# Patient Record
Sex: FEMALE | Race: WHITE | NOT HISPANIC OR LATINO | Employment: FULL TIME | URBAN - METROPOLITAN AREA
[De-identification: names, ages, dates, MRNs, and addresses within clinical notes are randomized per-mention and may not be internally consistent; named-entity substitution may affect disease eponyms.]

---

## 2017-03-31 ENCOUNTER — TELEPHONE (OUTPATIENT)
Dept: OBSTETRICS AND GYNECOLOGY | Facility: CLINIC | Age: 37
End: 2017-03-31

## 2017-03-31 DIAGNOSIS — A60.00 GENITAL HERPES SIMPLEX, UNSPECIFIED SITE: Primary | ICD-10-CM

## 2017-03-31 RX ORDER — VALACYCLOVIR HYDROCHLORIDE 500 MG/1
500 TABLET, FILM COATED ORAL 2 TIMES DAILY
Qty: 10 TABLET | Refills: 6 | Status: SHIPPED | OUTPATIENT
Start: 2017-03-31 | End: 2018-08-13

## 2017-08-01 DIAGNOSIS — N92.0 MENORRHAGIA: ICD-10-CM

## 2017-10-03 DIAGNOSIS — N92.0 MENORRHAGIA: ICD-10-CM

## 2018-02-05 ENCOUNTER — OFFICE VISIT (OUTPATIENT)
Dept: OBSTETRICS AND GYNECOLOGY | Facility: CLINIC | Age: 38
End: 2018-02-05
Attending: OBSTETRICS & GYNECOLOGY
Payer: OTHER GOVERNMENT

## 2018-02-05 VITALS
DIASTOLIC BLOOD PRESSURE: 80 MMHG | BODY MASS INDEX: 29.8 KG/M2 | HEIGHT: 63 IN | WEIGHT: 168.19 LBS | SYSTOLIC BLOOD PRESSURE: 120 MMHG

## 2018-02-05 DIAGNOSIS — R10.2 PELVIC PAIN IN FEMALE: Primary | ICD-10-CM

## 2018-02-05 LAB
BACTERIA #/AREA URNS AUTO: ABNORMAL /HPF
BILIRUB UR QL STRIP: NEGATIVE
CLARITY UR REFRACT.AUTO: ABNORMAL
COLOR UR AUTO: YELLOW
GLUCOSE UR QL STRIP: NEGATIVE
HGB UR QL STRIP: ABNORMAL
KETONES UR QL STRIP: NEGATIVE
LEUKOCYTE ESTERASE UR QL STRIP: ABNORMAL
MICROSCOPIC COMMENT: ABNORMAL
NITRITE UR QL STRIP: NEGATIVE
PH UR STRIP: 5 [PH] (ref 5–8)
PROT UR QL STRIP: NEGATIVE
RBC #/AREA URNS AUTO: 4 /HPF (ref 0–4)
SP GR UR STRIP: 1.01 (ref 1–1.03)
SQUAMOUS #/AREA URNS AUTO: 3 /HPF
URN SPEC COLLECT METH UR: ABNORMAL
UROBILINOGEN UR STRIP-ACNC: NEGATIVE EU/DL
WBC #/AREA URNS AUTO: 7 /HPF (ref 0–5)

## 2018-02-05 PROCEDURE — 87077 CULTURE AEROBIC IDENTIFY: CPT

## 2018-02-05 PROCEDURE — 81001 URINALYSIS AUTO W/SCOPE: CPT

## 2018-02-05 PROCEDURE — 87086 URINE CULTURE/COLONY COUNT: CPT

## 2018-02-05 PROCEDURE — 87186 SC STD MICRODIL/AGAR DIL: CPT

## 2018-02-05 PROCEDURE — 3008F BODY MASS INDEX DOCD: CPT | Mod: ,,, | Performed by: OBSTETRICS & GYNECOLOGY

## 2018-02-05 PROCEDURE — 99213 OFFICE O/P EST LOW 20 MIN: CPT | Mod: S$PBB,,, | Performed by: OBSTETRICS & GYNECOLOGY

## 2018-02-05 PROCEDURE — 99212 OFFICE O/P EST SF 10 MIN: CPT | Mod: PBBFAC | Performed by: OBSTETRICS & GYNECOLOGY

## 2018-02-05 PROCEDURE — 87088 URINE BACTERIA CULTURE: CPT

## 2018-02-05 PROCEDURE — 99999 PR PBB SHADOW E&M-EST. PATIENT-LVL II: CPT | Mod: PBBFAC,,, | Performed by: OBSTETRICS & GYNECOLOGY

## 2018-02-05 NOTE — PROGRESS NOTES
"Chief Complaint: Pelvic pain    Sreedhar Tyler is a 37 y.o. female  presents with dull-achy pain on the right-side in her lower pelvis.  She also reports discomfort with intercourse in the same area.  Her cycles have been irregular in flow and timing.      She has a history of a hemorrhagic cyst of the left ovary.      She stopped her OCP approximately 4 months ago to conceive.      /80   Ht 5' 3" (1.6 m)   Wt 76.3 kg (168 lb 3.4 oz)   LMP 2018 (Approximate)   BMI 29.80 kg/m²     ROS:  GENERAL: No fever, chills, fatigability or weight loss.  VULVAR: No pain, no lesions and no itching.  VAGINAL: No relaxation, no itching, no discharge, no abnormal bleeding and no lesions.  ABDOMEN: Reports abdominal pain. Denies nausea. Denies vomiting. No diarrhea. No constipation  BREAST: Denies pain. No lumps. No discharge.  URINARY: No incontinence, no nocturia, no frequency and no dysuria.  CARDIOVASCULAR: No chest pain. No shortness of breath. No leg cramps.  NEUROLOGICAL: No headaches. No vision changes.    PHYSICAL EXAM:    PELVIC: Normal external genitalia without lesions.  Normal hair distribution.  Adequate perineal body, normal urethral meatus.  Vagina moist and well rugated without lesions or discharge.  Cervix pink, without lesions, discharge or tenderness.  No significant cystocele or rectocele.  Bimanual exam shows uterus to be normal size, regular, mobile and nontender.  Adnexa without masses or tenderness but some discomfort on the right.      1. Pelvic pain in female  US Pelvis Comp with Transvag NON-OB (xpd    Urine culture    Urinalysis       Discussed possible causes of pelvic pain.  Ultrasound and UA C&S ordered to rule out causes.     Will email with results of above.        "

## 2018-02-08 ENCOUNTER — PATIENT MESSAGE (OUTPATIENT)
Dept: OBSTETRICS AND GYNECOLOGY | Facility: CLINIC | Age: 38
End: 2018-02-08

## 2018-02-08 DIAGNOSIS — N30.00 ACUTE CYSTITIS WITHOUT HEMATURIA: Primary | ICD-10-CM

## 2018-02-08 LAB — BACTERIA UR CULT: NORMAL

## 2018-02-08 RX ORDER — SULFAMETHOXAZOLE AND TRIMETHOPRIM 400; 80 MG/1; MG/1
1 TABLET ORAL 2 TIMES DAILY
Qty: 20 TABLET | Refills: 0 | Status: SHIPPED | OUTPATIENT
Start: 2018-02-08 | End: 2018-02-18

## 2018-02-15 ENCOUNTER — HOSPITAL ENCOUNTER (OUTPATIENT)
Dept: RADIOLOGY | Facility: HOSPITAL | Age: 38
Discharge: HOME | End: 2018-02-15
Attending: OBSTETRICS & GYNECOLOGY
Payer: OTHER GOVERNMENT

## 2018-02-15 DIAGNOSIS — R10.2 PELVIC PAIN IN FEMALE: ICD-10-CM

## 2018-02-15 PROCEDURE — 76830 TRANSVAGINAL US NON-OB: CPT | Mod: TC

## 2018-02-15 PROCEDURE — 76830 TRANSVAGINAL US NON-OB: CPT | Mod: 26,,, | Performed by: RADIOLOGY

## 2018-02-15 PROCEDURE — 76856 US EXAM PELVIC COMPLETE: CPT | Mod: 26,,, | Performed by: RADIOLOGY

## 2018-08-08 ENCOUNTER — TELEPHONE (OUTPATIENT)
Dept: OBSTETRICS AND GYNECOLOGY | Facility: CLINIC | Age: 38
End: 2018-08-08

## 2018-08-08 NOTE — TELEPHONE ENCOUNTER
Returned the pt's call to the clinic. Pt informed that Dr. Salinas isn't taking newly pregnant ob pt's and that a message will be sent to her to inquire if she can see the pt for her pregnancy. Pt informed that she will be contacted with Dr. Salinas's response. Pt verbalized understanding.        Can you see this patient for her pregnancy?

## 2018-08-08 NOTE — TELEPHONE ENCOUNTER
----- Message from Chichi Spencer sent at 8/8/2018 12:08 PM CDT -----  Contact: pt            Name of Who is Calling: TRISHA CROWE [[id number]]      What is the request in detail: pt is current pt of Dr Salinas and took a positive pregnancy test 2 weeks ago.. Pt would like to know if Dr Salinas would see her as a OB patient.. Please advise      Can the clinic reply by [facility]: no      What Number to Call Back if not in [facility]: [phone]

## 2018-08-10 ENCOUNTER — TELEPHONE (OUTPATIENT)
Dept: OBSTETRICS AND GYNECOLOGY | Facility: CLINIC | Age: 38
End: 2018-08-10

## 2018-08-10 DIAGNOSIS — Z32.01 POSITIVE URINE PREGNANCY TEST: Primary | ICD-10-CM

## 2018-08-10 NOTE — TELEPHONE ENCOUNTER
Contacted the pt to inform her that Dr. Salinas will be able to see her for her new pregnancy. Pt agreed to an appointment on 8/21 at 1PM With SWATHI Townsend and US appointment at 2PM on 8/21. Pt also agreed to an appointment on 9/20 at 3PM for her initial ob appointment with Dr. Salinas. Pt verbalized understanding. Letters sent out.

## 2018-08-12 ENCOUNTER — HOSPITAL ENCOUNTER (OUTPATIENT)
Facility: OTHER | Age: 38
Discharge: HOME | End: 2018-08-13
Attending: EMERGENCY MEDICINE | Admitting: OBSTETRICS & GYNECOLOGY
Payer: OTHER GOVERNMENT

## 2018-08-12 DIAGNOSIS — O03.9 SPONTANEOUS ABORTION: ICD-10-CM

## 2018-08-12 DIAGNOSIS — O20.9 VAGINAL BLEEDING AFFECTING EARLY PREGNANCY: Primary | ICD-10-CM

## 2018-08-12 PROCEDURE — 99291 CRITICAL CARE FIRST HOUR: CPT | Mod: ,,, | Performed by: EMERGENCY MEDICINE

## 2018-08-12 PROCEDURE — 86901 BLOOD TYPING SEROLOGIC RH(D): CPT

## 2018-08-12 PROCEDURE — 80053 COMPREHEN METABOLIC PANEL: CPT

## 2018-08-12 PROCEDURE — 85025 COMPLETE CBC W/AUTO DIFF WBC: CPT

## 2018-08-12 PROCEDURE — 84702 CHORIONIC GONADOTROPIN TEST: CPT

## 2018-08-12 RX ORDER — SODIUM CHLORIDE 9 MG/ML
125 INJECTION, SOLUTION INTRAVENOUS CONTINUOUS
Status: DISCONTINUED | OUTPATIENT
Start: 2018-08-13 | End: 2018-08-13 | Stop reason: HOSPADM

## 2018-08-13 ENCOUNTER — ANESTHESIA EVENT (OUTPATIENT)
Dept: SURGERY | Facility: OTHER | Age: 38
End: 2018-08-13
Payer: OTHER GOVERNMENT

## 2018-08-13 ENCOUNTER — ANESTHESIA (OUTPATIENT)
Dept: SURGERY | Facility: OTHER | Age: 38
End: 2018-08-13
Payer: OTHER GOVERNMENT

## 2018-08-13 VITALS
SYSTOLIC BLOOD PRESSURE: 101 MMHG | HEIGHT: 63 IN | HEART RATE: 81 BPM | RESPIRATION RATE: 18 BRPM | TEMPERATURE: 99 F | OXYGEN SATURATION: 98 % | DIASTOLIC BLOOD PRESSURE: 61 MMHG | WEIGHT: 155 LBS | BODY MASS INDEX: 27.46 KG/M2

## 2018-08-13 PROBLEM — O20.9 VAGINAL BLEEDING AFFECTING EARLY PREGNANCY: Status: ACTIVE | Noted: 2018-08-13

## 2018-08-13 LAB
ABO + RH BLD: NORMAL
ABO + RH BLD: NORMAL
ALBUMIN SERPL BCP-MCNC: 3.6 G/DL
ALP SERPL-CCNC: 66 U/L
ALT SERPL W/O P-5'-P-CCNC: 12 U/L
ANION GAP SERPL CALC-SCNC: 12 MMOL/L
AST SERPL-CCNC: 14 U/L
BASOPHILS # BLD AUTO: 0.03 K/UL
BASOPHILS # BLD AUTO: 0.07 K/UL
BASOPHILS NFR BLD: 0.2 %
BASOPHILS NFR BLD: 0.5 %
BILIRUB SERPL-MCNC: 0.4 MG/DL
BLD GP AB SCN CELLS X3 SERPL QL: NORMAL
BLD GP AB SCN CELLS X3 SERPL QL: NORMAL
BLD PROD TYP BPU: NORMAL
BLOOD UNIT EXPIRATION DATE: NORMAL
BLOOD UNIT TYPE CODE: 5100
BLOOD UNIT TYPE: NORMAL
BUN SERPL-MCNC: 12 MG/DL
CALCIUM SERPL-MCNC: 9 MG/DL
CHLORIDE SERPL-SCNC: 104 MMOL/L
CO2 SERPL-SCNC: 20 MMOL/L
CODING SYSTEM: NORMAL
CREAT SERPL-MCNC: 0.8 MG/DL
DIFFERENTIAL METHOD: ABNORMAL
DIFFERENTIAL METHOD: ABNORMAL
DISPENSE STATUS: NORMAL
EOSINOPHIL # BLD AUTO: 0 K/UL
EOSINOPHIL # BLD AUTO: 0 K/UL
EOSINOPHIL NFR BLD: 0.1 %
EOSINOPHIL NFR BLD: 0.2 %
ERYTHROCYTE [DISTWIDTH] IN BLOOD BY AUTOMATED COUNT: 13.4 %
ERYTHROCYTE [DISTWIDTH] IN BLOOD BY AUTOMATED COUNT: 13.7 %
EST. GFR  (AFRICAN AMERICAN): >60 ML/MIN/1.73 M^2
EST. GFR  (NON AFRICAN AMERICAN): >60 ML/MIN/1.73 M^2
GLUCOSE SERPL-MCNC: 232 MG/DL
HCG INTACT+B SERPL-ACNC: NORMAL MIU/ML
HCT VFR BLD AUTO: 23.9 %
HCT VFR BLD AUTO: 33.1 %
HGB BLD-MCNC: 11.4 G/DL
HGB BLD-MCNC: 8 G/DL
IMM GRANULOCYTES # BLD AUTO: 0.06 K/UL
IMM GRANULOCYTES NFR BLD AUTO: 0.4 %
LYMPHOCYTES # BLD AUTO: 2.6 K/UL
LYMPHOCYTES # BLD AUTO: 2.8 K/UL
LYMPHOCYTES NFR BLD: 17.7 %
LYMPHOCYTES NFR BLD: 20.7 %
MCH RBC QN AUTO: 27.2 PG
MCH RBC QN AUTO: 28.3 PG
MCHC RBC AUTO-ENTMCNC: 33.5 G/DL
MCHC RBC AUTO-ENTMCNC: 34.4 G/DL
MCV RBC AUTO: 81 FL
MCV RBC AUTO: 82 FL
MONOCYTES # BLD AUTO: 0.9 K/UL
MONOCYTES # BLD AUTO: 1 K/UL
MONOCYTES NFR BLD: 5.9 %
MONOCYTES NFR BLD: 7.4 %
NEUTROPHILS # BLD AUTO: 11.1 K/UL
NEUTROPHILS # BLD AUTO: 9.7 K/UL
NEUTROPHILS NFR BLD: 71.2 %
NEUTROPHILS NFR BLD: 75.3 %
NRBC BLD-RTO: 0 /100 WBC
NUM UNITS TRANS PACKED RBC: NORMAL
PLATELET # BLD AUTO: 308 K/UL
PLATELET # BLD AUTO: 432 K/UL
PMV BLD AUTO: 10.3 FL
PMV BLD AUTO: 9.6 FL
POTASSIUM SERPL-SCNC: 3.8 MMOL/L
PROT SERPL-MCNC: 6.6 G/DL
RBC # BLD AUTO: 2.94 M/UL
RBC # BLD AUTO: 4.03 M/UL
SODIUM SERPL-SCNC: 136 MMOL/L
WBC # BLD AUTO: 13.67 K/UL
WBC # BLD AUTO: 14.73 K/UL

## 2018-08-13 PROCEDURE — S0030 INJECTION, METRONIDAZOLE: HCPCS | Performed by: STUDENT IN AN ORGANIZED HEALTH CARE EDUCATION/TRAINING PROGRAM

## 2018-08-13 PROCEDURE — 63600175 PHARM REV CODE 636 W HCPCS: Performed by: EMERGENCY MEDICINE

## 2018-08-13 PROCEDURE — 63600175 PHARM REV CODE 636 W HCPCS: Performed by: STUDENT IN AN ORGANIZED HEALTH CARE EDUCATION/TRAINING PROGRAM

## 2018-08-13 PROCEDURE — 96375 TX/PRO/DX INJ NEW DRUG ADDON: CPT | Mod: 59

## 2018-08-13 PROCEDURE — 36000705 HC OR TIME LEV I EA ADD 15 MIN: Performed by: OBSTETRICS & GYNECOLOGY

## 2018-08-13 PROCEDURE — 25000003 PHARM REV CODE 250: Performed by: STUDENT IN AN ORGANIZED HEALTH CARE EDUCATION/TRAINING PROGRAM

## 2018-08-13 PROCEDURE — 71000033 HC RECOVERY, INTIAL HOUR: Performed by: OBSTETRICS & GYNECOLOGY

## 2018-08-13 PROCEDURE — 37000009 HC ANESTHESIA EA ADD 15 MINS: Performed by: OBSTETRICS & GYNECOLOGY

## 2018-08-13 PROCEDURE — 59812 TREATMENT OF MISCARRIAGE: CPT | Mod: ,,, | Performed by: OBSTETRICS & GYNECOLOGY

## 2018-08-13 PROCEDURE — 01965 ANES INCOMPL/MISSED AB PX: CPT | Performed by: OBSTETRICS & GYNECOLOGY

## 2018-08-13 PROCEDURE — 99285 EMERGENCY DEPT VISIT HI MDM: CPT | Mod: 25

## 2018-08-13 PROCEDURE — 36000704 HC OR TIME LEV I 1ST 15 MIN: Performed by: OBSTETRICS & GYNECOLOGY

## 2018-08-13 PROCEDURE — 37000008 HC ANESTHESIA 1ST 15 MINUTES: Performed by: OBSTETRICS & GYNECOLOGY

## 2018-08-13 PROCEDURE — P9045 ALBUMIN (HUMAN), 5%, 250 ML: HCPCS | Mod: JG | Performed by: NURSE ANESTHETIST, CERTIFIED REGISTERED

## 2018-08-13 PROCEDURE — G0378 HOSPITAL OBSERVATION PER HR: HCPCS

## 2018-08-13 PROCEDURE — 63600175 PHARM REV CODE 636 W HCPCS: Mod: JG | Performed by: NURSE ANESTHETIST, CERTIFIED REGISTERED

## 2018-08-13 PROCEDURE — 88305 TISSUE EXAM BY PATHOLOGIST: CPT | Mod: 26,,, | Performed by: PATHOLOGY

## 2018-08-13 PROCEDURE — 96374 THER/PROPH/DIAG INJ IV PUSH: CPT

## 2018-08-13 PROCEDURE — 85025 COMPLETE CBC W/AUTO DIFF WBC: CPT

## 2018-08-13 PROCEDURE — 25000003 PHARM REV CODE 250: Performed by: NURSE ANESTHETIST, CERTIFIED REGISTERED

## 2018-08-13 PROCEDURE — 94761 N-INVAS EAR/PLS OXIMETRY MLT: CPT | Mod: 59

## 2018-08-13 PROCEDURE — 86920 COMPATIBILITY TEST SPIN: CPT

## 2018-08-13 PROCEDURE — 88305 TISSUE EXAM BY PATHOLOGIST: CPT | Performed by: PATHOLOGY

## 2018-08-13 PROCEDURE — 86850 RBC ANTIBODY SCREEN: CPT

## 2018-08-13 PROCEDURE — 36415 COLL VENOUS BLD VENIPUNCTURE: CPT

## 2018-08-13 PROCEDURE — 36430 TRANSFUSION BLD/BLD COMPNT: CPT

## 2018-08-13 PROCEDURE — P9016 RBC LEUKOCYTES REDUCED: HCPCS

## 2018-08-13 RX ORDER — HYDROCODONE BITARTRATE AND ACETAMINOPHEN 5; 325 MG/1; MG/1
1 TABLET ORAL EVERY 4 HOURS PRN
Status: DISCONTINUED | OUTPATIENT
Start: 2018-08-13 | End: 2018-08-13 | Stop reason: HOSPADM

## 2018-08-13 RX ORDER — METRONIDAZOLE 500 MG/100ML
500 INJECTION, SOLUTION INTRAVENOUS ONCE
Status: COMPLETED | OUTPATIENT
Start: 2018-08-13 | End: 2018-08-13

## 2018-08-13 RX ORDER — LIDOCAINE HCL/PF 100 MG/5ML
SYRINGE (ML) INTRAVENOUS
Status: DISCONTINUED | OUTPATIENT
Start: 2018-08-13 | End: 2018-08-13

## 2018-08-13 RX ORDER — SUCCINYLCHOLINE CHLORIDE 20 MG/ML
INJECTION INTRAMUSCULAR; INTRAVENOUS
Status: DISCONTINUED | OUTPATIENT
Start: 2018-08-13 | End: 2018-08-13

## 2018-08-13 RX ORDER — KETOROLAC TROMETHAMINE 30 MG/ML
INJECTION, SOLUTION INTRAMUSCULAR; INTRAVENOUS
Status: DISCONTINUED | OUTPATIENT
Start: 2018-08-13 | End: 2018-08-13

## 2018-08-13 RX ORDER — MEPERIDINE HYDROCHLORIDE 50 MG/ML
12.5 INJECTION INTRAMUSCULAR; INTRAVENOUS; SUBCUTANEOUS ONCE AS NEEDED
Status: DISCONTINUED | OUTPATIENT
Start: 2018-08-13 | End: 2018-08-13 | Stop reason: HOSPADM

## 2018-08-13 RX ORDER — DIPHENHYDRAMINE HYDROCHLORIDE 50 MG/ML
25 INJECTION INTRAMUSCULAR; INTRAVENOUS EVERY 4 HOURS PRN
Status: DISCONTINUED | OUTPATIENT
Start: 2018-08-13 | End: 2018-08-13 | Stop reason: HOSPADM

## 2018-08-13 RX ORDER — DEXAMETHASONE SODIUM PHOSPHATE 4 MG/ML
INJECTION, SOLUTION INTRA-ARTICULAR; INTRALESIONAL; INTRAMUSCULAR; INTRAVENOUS; SOFT TISSUE
Status: DISCONTINUED | OUTPATIENT
Start: 2018-08-13 | End: 2018-08-13

## 2018-08-13 RX ORDER — ONDANSETRON 2 MG/ML
4 INJECTION INTRAMUSCULAR; INTRAVENOUS
Status: COMPLETED | OUTPATIENT
Start: 2018-08-13 | End: 2018-08-13

## 2018-08-13 RX ORDER — HYDROCODONE BITARTRATE AND ACETAMINOPHEN 10; 325 MG/1; MG/1
1 TABLET ORAL EVERY 4 HOURS PRN
Status: DISCONTINUED | OUTPATIENT
Start: 2018-08-13 | End: 2018-08-13 | Stop reason: HOSPADM

## 2018-08-13 RX ORDER — METOCLOPRAMIDE HYDROCHLORIDE 5 MG/ML
5 INJECTION INTRAMUSCULAR; INTRAVENOUS EVERY 6 HOURS PRN
Status: DISCONTINUED | OUTPATIENT
Start: 2018-08-13 | End: 2018-08-13 | Stop reason: HOSPADM

## 2018-08-13 RX ORDER — MORPHINE SULFATE 4 MG/ML
4 INJECTION, SOLUTION INTRAMUSCULAR; INTRAVENOUS
Status: COMPLETED | OUTPATIENT
Start: 2018-08-13 | End: 2018-08-13

## 2018-08-13 RX ORDER — SODIUM CHLORIDE 0.9 % (FLUSH) 0.9 %
3 SYRINGE (ML) INJECTION
Status: DISCONTINUED | OUTPATIENT
Start: 2018-08-13 | End: 2018-08-13 | Stop reason: HOSPADM

## 2018-08-13 RX ORDER — DIPHENHYDRAMINE HCL 25 MG
25 CAPSULE ORAL EVERY 4 HOURS PRN
Status: DISCONTINUED | OUTPATIENT
Start: 2018-08-13 | End: 2018-08-13 | Stop reason: HOSPADM

## 2018-08-13 RX ORDER — MISOPROSTOL 100 UG/1
200 TABLET ORAL EVERY 6 HOURS
Status: DISCONTINUED | OUTPATIENT
Start: 2018-08-13 | End: 2018-08-13 | Stop reason: HOSPADM

## 2018-08-13 RX ORDER — IBUPROFEN 600 MG/1
600 TABLET ORAL EVERY 6 HOURS PRN
Status: DISCONTINUED | OUTPATIENT
Start: 2018-08-13 | End: 2018-08-13 | Stop reason: HOSPADM

## 2018-08-13 RX ORDER — SODIUM CHLORIDE 9 MG/ML
INJECTION, SOLUTION INTRAVENOUS CONTINUOUS
Status: DISCONTINUED | OUTPATIENT
Start: 2018-08-13 | End: 2018-08-13

## 2018-08-13 RX ORDER — HYDROCODONE BITARTRATE AND ACETAMINOPHEN 500; 5 MG/1; MG/1
TABLET ORAL
Status: DISCONTINUED | OUTPATIENT
Start: 2018-08-13 | End: 2018-08-13 | Stop reason: HOSPADM

## 2018-08-13 RX ORDER — ONDANSETRON 8 MG/1
8 TABLET, ORALLY DISINTEGRATING ORAL EVERY 8 HOURS PRN
Status: DISCONTINUED | OUTPATIENT
Start: 2018-08-13 | End: 2018-08-13 | Stop reason: HOSPADM

## 2018-08-13 RX ORDER — ONDANSETRON 2 MG/ML
4 INJECTION INTRAMUSCULAR; INTRAVENOUS DAILY PRN
Status: DISCONTINUED | OUTPATIENT
Start: 2018-08-13 | End: 2018-08-13 | Stop reason: HOSPADM

## 2018-08-13 RX ORDER — ROCURONIUM BROMIDE 10 MG/ML
INJECTION, SOLUTION INTRAVENOUS
Status: DISCONTINUED | OUTPATIENT
Start: 2018-08-13 | End: 2018-08-13

## 2018-08-13 RX ORDER — METRONIDAZOLE 500 MG/100ML
500 INJECTION, SOLUTION INTRAVENOUS ONCE
Status: DISCONTINUED | OUTPATIENT
Start: 2018-08-13 | End: 2018-08-13 | Stop reason: HOSPADM

## 2018-08-13 RX ORDER — DIPHENHYDRAMINE HYDROCHLORIDE 50 MG/ML
12.5 INJECTION INTRAMUSCULAR; INTRAVENOUS EVERY 30 MIN PRN
Status: DISCONTINUED | OUTPATIENT
Start: 2018-08-13 | End: 2018-08-13 | Stop reason: HOSPADM

## 2018-08-13 RX ORDER — FENTANYL CITRATE 50 UG/ML
25 INJECTION, SOLUTION INTRAMUSCULAR; INTRAVENOUS EVERY 5 MIN PRN
Status: DISCONTINUED | OUTPATIENT
Start: 2018-08-13 | End: 2018-08-13 | Stop reason: HOSPADM

## 2018-08-13 RX ORDER — OXYCODONE HYDROCHLORIDE 5 MG/1
5 TABLET ORAL
Status: DISCONTINUED | OUTPATIENT
Start: 2018-08-13 | End: 2018-08-13 | Stop reason: HOSPADM

## 2018-08-13 RX ORDER — MISOPROSTOL 200 UG/1
200 TABLET ORAL EVERY 6 HOURS
Qty: 3 TABLET | Refills: 0 | Status: SHIPPED | OUTPATIENT
Start: 2018-08-13 | End: 2018-08-21

## 2018-08-13 RX ORDER — ACETAMINOPHEN 10 MG/ML
INJECTION, SOLUTION INTRAVENOUS
Status: DISCONTINUED | OUTPATIENT
Start: 2018-08-13 | End: 2018-08-13

## 2018-08-13 RX ORDER — ONDANSETRON 2 MG/ML
INJECTION INTRAMUSCULAR; INTRAVENOUS
Status: DISCONTINUED | OUTPATIENT
Start: 2018-08-13 | End: 2018-08-13

## 2018-08-13 RX ORDER — FENTANYL CITRATE 50 UG/ML
INJECTION, SOLUTION INTRAMUSCULAR; INTRAVENOUS
Status: DISCONTINUED | OUTPATIENT
Start: 2018-08-13 | End: 2018-08-13

## 2018-08-13 RX ORDER — HYDROMORPHONE HYDROCHLORIDE 2 MG/ML
0.4 INJECTION, SOLUTION INTRAMUSCULAR; INTRAVENOUS; SUBCUTANEOUS EVERY 5 MIN PRN
Status: DISCONTINUED | OUTPATIENT
Start: 2018-08-13 | End: 2018-08-13 | Stop reason: HOSPADM

## 2018-08-13 RX ORDER — ONDANSETRON 4 MG/1
4 TABLET, ORALLY DISINTEGRATING ORAL
Status: DISCONTINUED | OUTPATIENT
Start: 2018-08-13 | End: 2018-08-13

## 2018-08-13 RX ORDER — SODIUM CHLORIDE, SODIUM LACTATE, POTASSIUM CHLORIDE, CALCIUM CHLORIDE 600; 310; 30; 20 MG/100ML; MG/100ML; MG/100ML; MG/100ML
INJECTION, SOLUTION INTRAVENOUS CONTINUOUS PRN
Status: DISCONTINUED | OUTPATIENT
Start: 2018-08-13 | End: 2018-08-13

## 2018-08-13 RX ORDER — PROPOFOL 10 MG/ML
VIAL (ML) INTRAVENOUS
Status: DISCONTINUED | OUTPATIENT
Start: 2018-08-13 | End: 2018-08-13

## 2018-08-13 RX ORDER — ALBUMIN HUMAN 50 G/1000ML
SOLUTION INTRAVENOUS CONTINUOUS PRN
Status: DISCONTINUED | OUTPATIENT
Start: 2018-08-13 | End: 2018-08-13

## 2018-08-13 RX ADMIN — ALBUMIN (HUMAN): 2.5 SOLUTION INTRAVENOUS at 04:08

## 2018-08-13 RX ADMIN — LIDOCAINE HYDROCHLORIDE 100 MG: 20 INJECTION, SOLUTION INTRAVENOUS at 04:08

## 2018-08-13 RX ADMIN — SODIUM CHLORIDE 1000 ML: 0.9 INJECTION, SOLUTION INTRAVENOUS at 12:08

## 2018-08-13 RX ADMIN — SUCCINYLCHOLINE CHLORIDE 140 MG: 20 INJECTION, SOLUTION INTRAMUSCULAR; INTRAVENOUS at 04:08

## 2018-08-13 RX ADMIN — ROCURONIUM BROMIDE 5 MG: 10 INJECTION, SOLUTION INTRAVENOUS at 04:08

## 2018-08-13 RX ADMIN — SODIUM CHLORIDE: 0.9 INJECTION, SOLUTION INTRAVENOUS at 06:08

## 2018-08-13 RX ADMIN — ONDANSETRON 4 MG: 2 INJECTION INTRAMUSCULAR; INTRAVENOUS at 04:08

## 2018-08-13 RX ADMIN — MISOPROSTOL 200 MCG: 100 TABLET ORAL at 08:08

## 2018-08-13 RX ADMIN — KETOROLAC TROMETHAMINE 30 MG: 30 INJECTION, SOLUTION INTRAMUSCULAR; INTRAVENOUS at 04:08

## 2018-08-13 RX ADMIN — METRONIDAZOLE 500 MG: 500 SOLUTION INTRAVENOUS at 04:08

## 2018-08-13 RX ADMIN — CARBOXYMETHYLCELLULOSE SODIUM 4 DROP: 2.5 SOLUTION/ DROPS OPHTHALMIC at 04:08

## 2018-08-13 RX ADMIN — MORPHINE SULFATE 4 MG: 4 INJECTION INTRAVENOUS at 02:08

## 2018-08-13 RX ADMIN — SODIUM CHLORIDE, SODIUM LACTATE, POTASSIUM CHLORIDE, AND CALCIUM CHLORIDE: .6; .31; .03; .02 INJECTION, SOLUTION INTRAVENOUS at 04:08

## 2018-08-13 RX ADMIN — ACETAMINOPHEN 1000 MG: 10 INJECTION, SOLUTION INTRAVENOUS at 04:08

## 2018-08-13 RX ADMIN — DEXAMETHASONE SODIUM PHOSPHATE 8 MG: 4 INJECTION, SOLUTION INTRAMUSCULAR; INTRAVENOUS at 04:08

## 2018-08-13 RX ADMIN — SODIUM CHLORIDE 125 ML/HR: 0.9 INJECTION, SOLUTION INTRAVENOUS at 12:08

## 2018-08-13 RX ADMIN — ONDANSETRON 4 MG: 2 INJECTION, SOLUTION INTRAMUSCULAR; INTRAVENOUS at 12:08

## 2018-08-13 RX ADMIN — FENTANYL CITRATE 100 MCG: 50 INJECTION, SOLUTION INTRAMUSCULAR; INTRAVENOUS at 04:08

## 2018-08-13 RX ADMIN — PROPOFOL 100 MG: 10 INJECTION, EMULSION INTRAVENOUS at 04:08

## 2018-08-13 NOTE — HPI
Sreedhar Tyler is a 39 yo  who presents as a transfer from [facility] for heavy vaginal bleeding and tissue/clot passage. She is approximately 9w pregnant by LMP of 18. She reports having vaginal spotting starting in the evening of . She had no bleeding during this pregnancy prior to this. She reports heavier bleeding yesterday starting at 11am which progressively increased; she began passing tissue and clots around 1pm and by 3pm was bleeding so heavily in the car that the blood was pooling onto the floor. She began to feel dizziness, dyspnea, and n/v. Her watch was reading a heart rate in the 150s. Her  then drove the patient to [facility]. Patient is a nurse at [employer]. She reports intermittent cramping pain which worsens intermittently associated with heavier bleeding/clots. She denies fevers but reports chills and cold sweats. She denies syncope.  At [facility], H/H was found to be decreased from prior H/H from 2015 at 11.4/33.1 from 14.0/42.6. Bedside U/S showed no free fluid in abdomen. OB ultrasound showed gestational sac with no fetal pole but remaining POC in the uterus. Decision was made to transfer patient to [facility] [place], operated by [facility] for evaluation and management.

## 2018-08-13 NOTE — ED NOTES
LOC: The patient is awake, alert, and oriented to place, time, situation. Affect is appropriate.  Speech is appropriate and clear.     APPEARANCE: pt seems anxious and restless.    SKIN: The skin is warm and dry; pt seems pale in color.  Patient has normal skin turgor and moist mucus membranes.  Skin intact; no breakdown or bruising noted.     MUSCULOSKELETAL: Patient moving upper and lower extremities without difficulty.  Denies weakness.     RESPIRATORY: Airway is open and patent. Respirations spontaneous, even, easy, and non-labored.  Patient has increase in respiratory rat .  No accessory muscle use noted. Denies cough.     CARDIAC:  Tachycardia noted.  No peripheral edema noted. No complaints of chest pain.      ABDOMEN: Soft and non tender to palpation.  No distention noted.     NEUROLOGIC: Eyes open spontaneously.  Behavior appropriate to situation.  Follows commands; facial expression symmetrical.  Purposeful motor response noted; normal sensation in all extremities.    : large amounts of blood and clots from vaginal discharge.

## 2018-08-13 NOTE — ED NOTES
Pt placed on continuous cardiac and pulse ox monitoring with blood pressure to cycle every 30 minutes.  VS stable; tachycardia noted.  Bed locked in lowest position; side rails up and locked x 2; call light, bedside table, and personal belongings within reach.  Pt denies needs or complaints at this time; will continue to monitor pt.

## 2018-08-13 NOTE — ED NOTES
Pt states she is 8 weeks 6 days pregnant with her third pregnancy when she started bleeding this early this morning. When she felt herself feeling dizzy, she went to the emergency room where an ultrasound was performed. She has saturated 10 chucks pads since arrival in ED at [facility]. Pt HR is in 140's and elevates to 160's with movement. Pt came in with two 20 gauges in both AC's. Bag of NS running. Per Dr Jackson, continue IV Bolus. Pt is hooked up to cardiac monitor, VS taken, family member at bedside.

## 2018-08-13 NOTE — ED TRIAGE NOTES
Sreedhar Tyler, a 38 y.o. female presents to the ED with vaginal bleeding that started around noon. Pt noticed an increase in vaginal bleeding at 1500. 1830 is when the pt started to become symptomatic by having increased HR and feeling of lightlessness and dizziness. Pt also had 2 episodes of emesis during this time.     Chief Complaint   Patient presents with    Tachycardia     Pt states she is having a miscarriage and having increase of bleeding. She states tachycardia, SOB, dizziness starting PTA.     Vaginal Bleeding     Review of patient's allergies indicates:   Allergen Reactions    Azithromycin Rash    Doryx [doxycycline hyclate] Rash    Phenergan plain Palpitations    Tetracyclines Rash     Past Medical History:   Diagnosis Date    Adult ADHD 8/27/2013    Asthma     Ovarian cyst

## 2018-08-13 NOTE — BRIEF OP NOTE
[facility]  Brief Operative Note     SUMMARY     Surgery Date: 8/13/2018     Surgeon(s) and Role:     * Carmen Hill MD - Primary    Assisting Surgeon: None    Pre-op Diagnosis:  Missed ab [O02.1]    Post-op Diagnosis:  Post-Op Diagnosis Codes:     * Missed ab [O02.1]    Procedure(s) (LRB):  DILATION AND CURETTAGE, UTERUS, USING SUCTION (N/A)    Anesthesia: Choice    Description of the findings of the procedure:   Patient was seen in the ED [place] a [place] [facility] [place] with heavy vaginal bleeding and concern for retained products of conception; ultrasound showed remaining gestational sac with no fetal pole or cardiac activity. Due to continued heavy bleeding with associated symptomatic anemia, decision was made to take patient to the OR for D&C. Suction D&C was performed with no complications. Patient's cervix was found to be dilated; uterus sounded to 9cm. The 12mm suction curette was used using several passes to ensure any remaining tissue was evacuated. The sharp curette was carefully used to scrap the uterus until a gritty texture was noted. Following the procedure, bleeding had decreased dramatically.     Estimated Blood Loss: ~ 200 mL         Specimens:   Specimen (12h ago, onward)    Start     Ordered    08/13/18 0442  Specimen to Pathology - Surgery  Once     Comments:  1.  Products of conception     Start Status   08/13/18 0442 Collected (08/13/18 0442)       08/13/18 0441          Discharge Note    SUMMARY     Admit Date: 8/12/2018    Discharge Date and Time:  08/13/2018 6:15 AM    Hospital Course (synopsis of major diagnoses, care, treatment, and services provided during the course of the hospital stay): Patient was seen in the ED as a [place] [facility] [place] with heavy vaginal bleeding and concern for retained products of conception; ultrasound showed remaining gestational sac with no fetal pole or cardiac activity. Due to continued heavy bleeding with associated  symptomatic anemia, decision was made to take patient to the OR for D&C. Please see OP note for further details. Tolerated procedure well and patient was taken to recovery in a stable condition. One unit of pRBC was transfused given patient's symptomatic anemia. Prior to discharge patient was able to void, ambulate, tolerate PO and pain was well controlled with PO meds. Patient was given routine post-op instructions for which patient voiced understanding. Patient was subsequently discharged home.     Final Diagnosis: Post-Op Diagnosis Codes:     * Missed ab [O02.1]    Disposition: Home or Self Care    Follow Up/Patient Instructions:     Medications:  Reconciled Home Medications:      Medication List      START taking these medications    miSOPROStol 200 MCG Tab  Commonly known as:  CYTOTEC  Take 1 tablet (200 mcg total) by mouth every 6 (six) hours.          Discharge Procedure Orders   Other restrictions (specify):   Order Comments: Pelvic rest for 1-2 weeks. Nothing in vagina -no sex, tampons, douching, etc.    No baths (showers only) for 1-2 weeks     Notify your health care provider if you experience any of the following:  temperature >100.4     Notify your health care provider if you experience any of the following:  persistent nausea and vomiting or diarrhea     Notify your health care provider if you experience any of the following:  severe uncontrolled pain     Notify your health care provider if you experience any of the following:   Order Comments: Vaginal bleeding soaking 1-2 pads per hour for 2 or more hours    Foul smelling vaginal discharge     Follow-up Information     Leana Salinas MD. Schedule an appointment as soon as possible for a visit in 1 week.    Specialties:  Obstetrics, Obstetrics and Gynecology  Why:  Follow up visit  Contact information:  [address] [id number] [phone]

## 2018-08-13 NOTE — TRANSFER OF CARE
"Anesthesia Transfer of Care Note    Patient: Sreedhar Tyler    Procedure(s) Performed: Procedure(s) (LRB):  DILATION AND CURETTAGE, UTERUS, USING SUCTION (N/A)    Patient location: PACU    Anesthesia Type: general    Transport from OR: Transported from OR on 2-3 L/min O2 by NC with adequate spontaneous ventilation    Post pain: adequate analgesia    Post assessment: no apparent anesthetic complications and tolerated procedure well    Post vital signs: stable    Level of consciousness: awake, alert and oriented    Nausea/Vomiting: no nausea/vomiting    Complications: none    Transfer of care protocol was followed      Last vitals:   Visit Vitals  BP (!) 99/56   Pulse (!) 117   Temp 37.3 °C (99.1 °F)   Resp 16   Ht 5' 3" (1.6 m)   Wt 70.3 kg (155 lb)   LMP 06/06/2018 (Approximate)   SpO2 100%   Breastfeeding? No   BMI 27.46 kg/m²     "

## 2018-08-13 NOTE — ED PROVIDER NOTES
Encounter Date: 8/12/2018    SCRIBE #1 NOTE: INirali, am scribing for, and in the presence of, Dr. Jackson.       History     Chief Complaint   Patient presents with    Tachycardia     Pt states she is having a miscarriage and having increase of bleeding. She states tachycardia, SOB, dizziness starting PTA.     Vaginal Bleeding     Time seen by provider: 2:25 AM    This is a 38 y.o. female who presents with complaint of vaginal bleeding that began approximately 12 hours ago. Patient was transferred from [facility] for further evaluation by Obstetrics. Patient reports she is approximately 9 weeks pregnant. She reports passing clots with vaginal bleeding. She reports diaphoresis, shortness of breath, palpitations, abdominal pain, lightheadedness. She denies fever, chest pain, nausea, vomiting, or syncope.       The history is provided by the patient.     Review of patient's allergies indicates:   Allergen Reactions    Azithromycin Rash    Doryx [doxycycline hyclate] Rash    Phenergan plain Palpitations    Tetracyclines Rash     Past Medical History:   Diagnosis Date    Adult ADHD 8/27/2013    Asthma     Ovarian cyst      Past Surgical History:   Procedure Laterality Date    OVARIAN CYST REMOVAL      TONSILLECTOMY       Family History   Problem Relation Age of Onset    Breast cancer Neg Hx     Colon cancer Neg Hx     Ovarian cancer Neg Hx      Social History     Tobacco Use    Smoking status: Never Smoker   Substance Use Topics    Alcohol use: Yes     Comment: occassionally    Drug use: No     Review of Systems   Constitutional: Positive for diaphoresis. Negative for fever.   HENT: Negative for sore throat.    Eyes: Negative for redness.   Respiratory: Positive for shortness of breath. Negative for cough.    Cardiovascular: Positive for palpitations. Negative for chest pain.   Gastrointestinal: Positive for abdominal pain. Negative for diarrhea, nausea and vomiting.   Genitourinary:  Positive for vaginal bleeding. Negative for dysuria.   Musculoskeletal: Negative for back pain.   Skin: Negative for rash.   Neurological: Positive for light-headedness. Negative for syncope.       Physical Exam     Initial Vitals   BP Pulse Resp Temp SpO2   08/12/18 2340 08/12/18 2340 08/12/18 2340 08/12/18 2340 08/12/18 2348   122/82 (!) 184 (!) 24 98 °F (36.7 °C) 98 %      MAP       --                Physical Exam    Nursing note and vitals reviewed.  Constitutional: She appears well-developed and well-nourished. She appears distressed.   HENT:   Head: Normocephalic and atraumatic.   Eyes: EOM are normal.   Neck: Normal range of motion.   Cardiovascular: Regular rhythm and normal heart sounds. Tachycardia present.  Exam reveals no gallop and no friction rub.    No murmur heard.  Pulmonary/Chest: Breath sounds normal. No respiratory distress. She has no wheezes. She has no rhonchi. She has no rales.   Abdominal: Soft. There is tenderness. There is no rebound and no guarding.   Bilateral lower quadrant tenderness.    Genitourinary:   Genitourinary Comments: Defer to Gyn.    Musculoskeletal: Normal range of motion. She exhibits no edema or tenderness.   Neurological: She is alert and oriented to person, place, and time.   Skin: No rash noted. There is pallor.   Psychiatric: She has a normal mood and affect. Her behavior is normal. Judgment and thought content normal.         ED Course   Procedures  Labs Reviewed   CBC W/ AUTO DIFFERENTIAL - Abnormal; Notable for the following components:       Result Value    WBC 14.73 (*)     Hemoglobin 11.4 (*)     Hematocrit 33.1 (*)     Platelets 432 (*)     Gran # (ANC) 11.1 (*)     Immature Grans (Abs) 0.06 (*)     Gran% 75.3 (*)     Lymph% 17.7 (*)     All other components within normal limits   COMPREHENSIVE METABOLIC PANEL - Abnormal; Notable for the following components:    CO2 20 (*)     Glucose 232 (*)     All other components within normal limits   HCG, QUANTITATIVE,  PREGNANCY   POCT URINE PREGNANCY   TYPE & SCREEN   GROUP & RH          Imaging Results          US OB Less Than 14 Wks with Transvag(xpd (Final result)     Abnormal  Result time 08/13/18 02:23:04    Final result by Gennaro Roger MD (08/13/18 02:23:04)                 Impression:      Limited examination due to absence of transvaginal images, noting the presence of a single gestational sac within the lower uterine segment.  No embryo or fetal heart rate identified on transabdominal images.  Findings could represent a failed or failing pregnancy in the setting of vaginal bleeding.  Recommend correlation with clinical findings, beta HCG level, and short-term follow-up.    This report was flagged in Epic as abnormal.    Electronically signed by resident: Wilder Gonzalez  Date:    08/13/2018  Time:    02:00    Electronically signed by: Gennaro Roger MD  Date:    08/13/2018  Time:    02:23             Narrative:    EXAMINATION:  US OB LESS THAN 14 WKS WITH TRANSVAGINAL (XPD)    CLINICAL HISTORY:  Vag Bleeding;    TECHNIQUE:  Transabdominal sonography of the pelvis was performed.  Transvaginal images were deferred at patient request.    COMPARISON:  Ultrasound pelvis 02/15/2018.    FINDINGS:  Evaluation is significantly limited an early pregnancy secondary to lack of transvaginal images.    There is a single gestational sac identified in the lower uterine segment with mean sac size of 2.5 cm.  No embryo or fetal heart rate is identified on transabdominal images.    The cervix appears closed.    The right ovary is unremarkable, measuring 2.9 x 2.4 x 2.3 cm.  Left ovary is not definitively visualized.  There is no significant free fluid in the pelvis.                                 Medical Decision Making:   ED Management:  Patient transferred from [facility] emergency department.  I have discussed with the transfer center prior to arrival.  Patient proximally 8 weeks with heavy vaginal bleeding.  H&H has dropped  from prior in May.  Not currently in need of transfusion.  She remained persistently tachycardic, but blood pressure is steady after saline hydration.  She is here for gyn evaluation.  I have discussed her presentation with them and they evaluated her upon arrival and determine her need for emergent D&C.  I have controlled her pain with morphine in the meantime.  She is taken emergently to the operating theater.    RASHEED Jackson M.D.  2018  3:48 AM                Attending Attestation:           Physician Attestation for Scribe:  Physician Attestation Statement for Scribe #1: I, Dr. Jackson, reviewed documentation, as scribed by Nirali Chau in my presence, and it is both accurate and complete.                    Clinical Impression:     1. Vaginal bleeding affecting early pregnancy    2. Spontaneous                                    Carlos Jackson MD  [phone]

## 2018-08-13 NOTE — OP NOTE
Operative Report     Procedure:  1. Suction Dilation and Curettage     Date of Surgery: 08/14/2018     Surgeon: Carmen Hill MD     Assistant: Sushma K Reddy, MD (RES)     Pre-Op Diagnosis:   1. Incomplete AB    Post-op Diagnosis:  same     EBL: 200 mL     IVF: 850 mL       Specimen: products of conception     Findings:   1. Normal vulva and introitus  2. Uterus sounded to 9 cm  3. Suction curettage used to remove remaining products of conception  4. Sharp curettage used following suction curettage until gritty texture of the endometrial lining noted.  5. Bleeding was significantly improved following the procedure.     Procedure in Detail:  Patient was seen in the ED as a [facility] missed AB with heavy vaginal bleeding and concern for retained products of conception; ultrasound showed remaining gestational sac with no fetal pole or cardiac activity. Due to continued heavy bleeding with associated symptomatic anemia, decision was made to take patient to the OR for D&C.    The patient was taken to the operating room where general anesthesia was obtained, the patient was placed in the lithotomy position, with her legs in the Yellow fin stirrups. The patient was then prepped and draped in the normal sterile fashion and her bladder was drained using in-and-out catheterization. The weighted speculum was placed in the posterior aspect of the vagina and the right angle retractor was placed in the anterior aspect of the vagina.  The cervix was visualized and a single-tooth tenaculum was placed on the anterior aspect of the cervix. The uterus sounded to 9 cm. The cervix was dilated to allow introduction of the 12mm suction curette. Several passes were made with the suction curette to remove products of conception from the uterine cavity. After tissue was no longer noted with suction curettage, the sharp curette was passed several times until a gritty texture was noted.   The single tooth tenaculum was then  removed and the cervix was noted to be hemostatic. Bleeding from the uterus was noted to have decreased greatly following the procedure.  Sponge, lap and needle counts were correct x 2.  The patient tolerated the procedure well and was taken to recovery in stable condition.     Sushma K. Reddy, MD

## 2018-08-13 NOTE — ASSESSMENT & PLAN NOTE
- given findings on ultrasound, patient had missed AB  - likely products of conception retained in the uterus  - heavy active vaginal bleeding continues with drop in H/H  - will plan for suction D&C now  - consents signed, house supervisor notified, case request placed  - will give flagyl 500 pre and post-op for antibiotic ppx  - T&S and CBC ordered pre-op  - on call staff notified

## 2018-08-13 NOTE — NURSING
"Discharge instructions reviewed with patient and , all questions answered and understanding verbalized.  Precautions reviewed.  Medication list reviewed.  Follow-up discussed.  OB resident paged regarding f/u appointment, pt currently has an appointment scheduled 8/21 for "pregnancy confirmation".  Resident advised keeping that appointment for post D&C f/u.  Message relayed to patient.  SE of Cytotec discussed.  Pt was instructed to refraim from getting pregnant for 1 month following completion of Cytotec.  IVs removed and catheter tips intact.  OB resident states pt is ready for discharge now and no other provider needs to see her.  Transport requested.  Pt being discharged home with her .  "

## 2018-08-13 NOTE — DISCHARGE INSTRUCTIONS
Discharge Instructions for Dilation and Curettage (D and C)  Your doctor performed dilation and curettage (D&C). The reasons for having this procedure vary from person to person. The D&C may be done to control heavy uterine bleeding, to find the cause of irregular bleeding, to perform an , or to remove pregnancy tissue if you have had a miscarriage.            START taking these medications    miSOPROStol 200 MCG Tab  Commonly known as:  CYTOTEC  Take 1 tablet (200 mcg total) by mouth every 6 (six) hours.                 Discharge Procedure Orders   Other restrictions (specify):   Order Comments: Pelvic rest for 1-2 weeks. Nothing in vagina -no sex, tampons, douching, etc.     No baths (showers only) for 1-2 weeks      Notify your health care provider if you experience any of the following:  temperature >100.4      Notify your health care provider if you experience any of the following:  persistent nausea and vomiting or diarrhea      Notify your health care provider if you experience any of the following:  severe uncontrolled pain          Notify your health care provider if you experience any of the following:   Order Comments: Vaginal bleeding soaking 1-2 pads per hour for 2 or more hours     Foul smelling vaginal discharge                         · Take it easy. Rest for 2 days as needed.  · Return to your normal activities after 24 to 48 hours. You may also return to work at that time.  · Eat a normal diet.  · Take an over-the-counter pain reliever for pain, if needed.  · Remember, its OK to have bleeding for about a week after the procedure. The amount of bleeding should be similar to what you have during a normal period.  · Dont drive for 24 hours after the procedure unless specifically told by your provider that it is OK to do so.  · Dont have sexual intercourse or use tampons or douches until your doctor says its safe to do so.  Follow-up  Follow-up Information      Leana Salinas MD.  Schedule an appointment as soon as possible for a visit in 1 week.    Specialties:  Obstetrics, Obstetrics and Gynecology  Why:  Follow up visit  Contact information:  [address] [place] [id number]  [phone]           When to call your doctor  Other restrictions (specify):   Order Comments: Pelvic rest for 1-2 weeks. Nothing in vagina -no sex, tampons, douching, etc.     No baths (showers only) for 1-2 weeks      Notify your health care provider if you experience any of the following:  temperature >100.4      Notify your health care provider if you experience any of the following:  persistent nausea and vomiting or diarrhea      Notify your health care provider if you experience any of the following:  severe uncontrolled pain          Notify your health care provider if you experience any of the following:   Order Comments: Vaginal bleeding soaking 1-2 pads per hour for 2 or more hours     Foul smelling vaginal discharge        Date Last Reviewed: 5/19/2015  © 6563-7952 [employer]. [address] [place] [address], [place] [address]. All rights reserved. This information is not intended as a substitute for professional medical care. Always follow your healthcare professional's instructions.

## 2018-08-13 NOTE — ANESTHESIA PREPROCEDURE EVALUATION
08/13/2018  Sreedhar Tyler is a 38 y.o., female.    Anesthesia Evaluation    I have reviewed the Patient Summary Reports.    I have reviewed the Nursing Notes.   I have reviewed the Medications.     Review of Systems  Anesthesia Hx:  Denies Family Hx of Anesthesia complications.   Denies Personal Hx of Anesthesia complications.   Social:  Non-Smoker    Hematology/Oncology:     Oncology Normal    -- Anemia:   EENT/Dental:EENT/Dental Normal   Cardiovascular:  Cardiovascular Normal     Pulmonary:   Asthma (exercise induced, no inhaler use)    Renal/:  Renal/ Normal     Hepatic/GI:  Hepatic/GI Normal    Musculoskeletal:  Musculoskeletal Normal    OB/GYN/PEDS:  Missed AB, symptomatic bleeding    Diaphoresis, N&V, tachycardia    HCT 33, since rec'd 2 L NS, repeat pending     Neurological:  Neurology Normal    Endocrine:  Endocrine Normal    Dermatological:  Skin Normal    Psych:  Psychiatric Normal           Physical Exam  General:  Well nourished    Airway/Jaw/Neck:  Airway Findings: Mouth Opening: Normal Mallampati: I  TM Distance: Normal, at least 6 cm      Dental:  Dental Findings: In tact        Mental Status:  Mental Status Findings:  Cooperative, Alert and Oriented         Anesthesia Plan  Type of Anesthesia, risks & benefits discussed:  Anesthesia Type:  general  Patient's Preference:   Intra-op Monitoring Plan: standard ASA monitors  Intra-op Monitoring Plan Comments:   Post Op Pain Control Plan:   Post Op Pain Control Plan Comments:   Induction:   IV and rapid sequence  Beta Blocker:         Informed Consent: Patient understands risks and agrees with Anesthesia plan.  Questions answered. Anesthesia consent signed with patient.  ASA Score: 2  emergent   Day of Surgery Review of History & Physical:    H&P update referred to the surgeon.     Anesthesia Plan Notes: T&S done at [facility]!!! Will  send.        Ready For Surgery From Anesthesia Perspective.

## 2018-08-13 NOTE — H&P
[facility]  Obstetrics  History & Physical    Patient Name: Sreedhar Tyler  MRN: [id number]  Admission Date: 2018  Primary Care Provider: Kevyn Mcintosh MD    Subjective:     Principal Problem:Vaginal bleeding affecting early pregnancy    History of Present Illness:  Sreedhar Tyler is a 37 yo  who presents as a transfer from [facility] for heavy vaginal bleeding and tissue/clot passage. She is approximately 9w pregnant by LMP of 18. She reports having vaginal spotting starting in the evening of . She had no bleeding during this pregnancy prior to this. She reports heavier bleeding yesterday starting at 11am which progressively increased; she began passing tissue and clots around 1pm and by 3pm was bleeding so heavily in the car that the blood was pooling onto the floor. She began to feel dizziness, dyspnea, and n/v. Her watch was reading a heart rate in the 150s. Her  then drove the patient to [facility]. 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 She reports intermittent cramping pain which worsens intermittently associated with heavier bleeding/clots. She denies fevers but reports chills and cold sweats. She denies syncope.  At [facility], H/H was found to be decreased from prior H/H from 2015 at 11.4/33.1 from 14.0/42.6. Bedside U/S showed no free fluid in abdomen. OB ultrasound showed gestational sac with no fetal pole but remaining POC in the uterus. Decision was made to transfer patient to [facility] for evaluation and management.          Obstetric History       T1      L0     SAB0   TAB0   Ectopic0   Multiple0   Live Births0       # Outcome Date GA Lbr Phong/2nd Weight Sex Delivery Anes PTL Lv   3 Current            2 AB            1 Term                 Past Medical History:   Diagnosis Date    Adult ADHD 2013    Asthma     Ovarian cyst      Past Surgical History:   Procedure Laterality Date    OVARIAN CYST REMOVAL      TONSILLECTOMY         No medications prior to admission.        Review of patient's allergies indicates:   Allergen Reactions    Azithromycin Rash    Doryx [doxycycline hyclate] Rash    Phenergan plain Palpitations    Tetracyclines Rash        Family History     None        Tobacco Use    Smoking status: Never Smoker   Substance and Sexual Activity    Alcohol use: Yes     Comment: occassionally    Drug use: No    Sexual activity: Yes     Partners: Male     Birth control/protection: None     Review of Systems   Constitutional: Positive for chills and diaphoresis. Negative for fever.   Eyes: Negative for visual disturbance.   Respiratory: Positive for shortness of breath.    Cardiovascular: Negative for chest pain.   Gastrointestinal: Positive for abdominal pain (cramping), nausea and vomiting. Negative for blood in stool, constipation and diarrhea.   Genitourinary: Positive for vaginal bleeding and vaginal discharge. Negative for dysuria, vaginal pain and vaginal odor.   Neurological: Negative for syncope and headaches.   Psychiatric/Behavioral: Negative for depression.      Objective:     Vital Signs (Most Recent):  Temp: 99.1 °F (37.3 °C) (08/13/18 0458)  Pulse: 99 (08/13/18 0538)  Resp: 16 (08/13/18 0538)  BP: 98/64 (08/13/18 0538)  SpO2: 100 % (08/13/18 0538) Vital Signs (24h Range):  Temp:  [98 °F (36.7 °C)-99.1 °F (37.3 °C)] 99.1 °F (37.3 °C)  Pulse:  [] 99  Resp:  [15-26] 16  SpO2:  [98 %-100 %] 100 %  BP: ()/(50-91) 98/64     Weight: 70.3 kg (155 lb)  Body mass index is 27.46 kg/m².    Physical Exam:   Constitutional: She is oriented to person, place, and time. She appears well-developed and well-nourished.   Patient appears pale and weak    HENT:   Head: Normocephalic and atraumatic.    Eyes: EOM are normal.    Neck: Normal range of motion.    Cardiovascular: Regular rhythm and normal heart sounds.    tachycardic    Pulmonary/Chest: Effort normal. No respiratory distress.        Abdominal: Soft. There is no tenderness. There is no rebound and no  guarding.     Genitourinary:   Genitourinary Comments: SSE: blood actively filling the vault, unable to evacuate with swabs to see cervix. Clots and possible tissue present in the vault.  SVE: cervix open to external os               Neurological: She is alert and oriented to person, place, and time.    Skin: Skin is warm. There is pallor.    Psychiatric: She has a normal mood and affect. Her behavior is normal. Judgment and thought content normal.            Significant Labs:  Lab Results   Component Value Date    [facility]       CBC:   Lab  18   2354   WBC  14.73*   HGB  11.4*   HCT  33.1*   MCV  82   PLT  432*      I have personallly reviewed all pertinent lab results from the last 24 hours.    Assessment/Plan:     38 y.o. female  at Unknown for:    * Vaginal bleeding affecting early pregnancy    - given findings on ultrasound, patient had missed AB  - likely products of conception retained in the uterus  - heavy active vaginal bleeding continues with drop in H/H  - will plan for suction D&C now  - consents signed, house supervisor notified, case request placed  - will give flagyl 500 pre and post-op for antibiotic ppx  - T&S and CBC ordered pre-op  - on call staff notified            Sushma K Reddy, MD  Obstetrics  [facility]

## 2018-08-13 NOTE — ANESTHESIA POSTPROCEDURE EVALUATION
"Anesthesia Post Evaluation    Patient: Sreedhar Tyler    Procedure(s) Performed: Procedure(s) (LRB):  DILATION AND CURETTAGE, UTERUS, USING SUCTION (N/A)    Final Anesthesia Type: general  Patient location during evaluation: PACU  Patient participation: Yes- Able to Participate  Level of consciousness: awake and alert  Post-procedure vital signs: reviewed and stable  Pain management: adequate  Airway patency: patent  PONV status at discharge: No PONV  Anesthetic complications: no      Cardiovascular status: blood pressure returned to baseline and stable (BP still 90's/50's, but stable, blood transfusion ordered)  Respiratory status: unassisted, spontaneous ventilation and room air  Hydration status: euvolemic  Follow-up not needed.        Visit Vitals  BP (!) 93/50   Pulse 100   Temp 37.3 °C (99.1 °F)   Resp 16   Ht 5' 3" (1.6 m)   Wt 70.3 kg (155 lb)   LMP 06/06/2018 (Approximate)   SpO2 100%   Breastfeeding? No   BMI 27.46 kg/m²       Pain/Alyssa Score: Pain Assessment Performed: Yes (8/13/2018  4:58 AM)  Presence of Pain: denies (8/13/2018  4:58 AM)  Pain Rating Prior to Med Admin: 7 (8/13/2018  2:32 AM)  Alyssa Score: 9 (8/13/2018  4:58 AM)        "

## 2018-08-13 NOTE — SUBJECTIVE & OBJECTIVE
Obstetric History       T1      L0     SAB0   TAB0   Ectopic0   Multiple0   Live Births0       # Outcome Date GA Lbr Phong/2nd Weight Sex Delivery Anes PTL Lv   3 Current            2 AB            1 Term                 Past Medical History:   Diagnosis Date    Adult ADHD 2013    Asthma     Ovarian cyst      Past Surgical History:   Procedure Laterality Date    OVARIAN CYST REMOVAL      TONSILLECTOMY         No medications prior to admission.       Review of patient's allergies indicates:   Allergen Reactions    Azithromycin Rash    Doryx [doxycycline hyclate] Rash    Phenergan plain Palpitations    Tetracyclines Rash        Family History     None        Tobacco Use    Smoking status: Never Smoker   Substance and Sexual Activity    Alcohol use: Yes     Comment: occassionally    Drug use: No    Sexual activity: Yes     Partners: Male     Birth control/protection: None     Review of Systems   Constitutional: Positive for chills and diaphoresis. Negative for fever.   Eyes: Negative for visual disturbance.   Respiratory: Positive for shortness of breath.    Cardiovascular: Negative for chest pain.   Gastrointestinal: Positive for abdominal pain (cramping), nausea and vomiting. Negative for blood in stool, constipation and diarrhea.   Genitourinary: Positive for vaginal bleeding and vaginal discharge. Negative for dysuria, vaginal pain and vaginal odor.   Neurological: Negative for syncope and headaches.   Psychiatric/Behavioral: Negative for depression.      Objective:     Vital Signs (Most Recent):  Temp: 99.1 °F (37.3 °C) (18 0458)  Pulse: 99 (18 0538)  Resp: 16 (18 0538)  BP: 98/64 (18 0538)  SpO2: 100 % (18 0538) Vital Signs (24h Range):  Temp:  [98 °F (36.7 °C)-99.1 °F (37.3 °C)] 99.1 °F (37.3 °C)  Pulse:  [] 99  Resp:  [15-26] 16  SpO2:  [98 %-100 %] 100 %  BP: ()/(50-91) 98/64     Weight: 70.3 kg (155 lb)  Body mass index is 27.46  kg/m².    Physical Exam:   Constitutional: She is oriented to person, place, and time. She appears well-developed and well-nourished.   Patient appears pale and weak    HENT:   Head: Normocephalic and atraumatic.    Eyes: EOM are normal.    Neck: Normal range of motion.    Cardiovascular: Regular rhythm and normal heart sounds.    tachycardic    Pulmonary/Chest: Effort normal. No respiratory distress.        Abdominal: Soft. There is no tenderness. There is no rebound and no guarding.     Genitourinary:   Genitourinary Comments: SSE: blood actively filling the vault, unable to evacuate with swabs to see cervix. Clots and possible tissue present in the vault.  SVE: cervix open to external os               Neurological: She is alert and oriented to person, place, and time.    Skin: Skin is warm. There is pallor.    Psychiatric: She has a normal mood and affect. Her behavior is normal. Judgment and thought content normal.            Significant Labs:  Lab Results   Component Value Date    [facility] B POS 08/13/2018       CBC:   Lab  08/12/18   2354   WBC  14.73*   HGB  11.4*   HCT  33.1*   MCV  82   PLT  432*      I have personallly reviewed all pertinent lab results from the last 24 hours.

## 2018-08-21 ENCOUNTER — OFFICE VISIT (OUTPATIENT)
Dept: OBSTETRICS AND GYNECOLOGY | Facility: CLINIC | Age: 38
End: 2018-08-21
Payer: OTHER GOVERNMENT

## 2018-08-21 ENCOUNTER — LAB VISIT (OUTPATIENT)
Dept: LAB | Facility: OTHER | Age: 38
End: 2018-08-21
Payer: OTHER GOVERNMENT

## 2018-08-21 VITALS
HEIGHT: 63 IN | DIASTOLIC BLOOD PRESSURE: 82 MMHG | BODY MASS INDEX: 27.46 KG/M2 | WEIGHT: 155 LBS | SYSTOLIC BLOOD PRESSURE: 112 MMHG

## 2018-08-21 DIAGNOSIS — Z98.890 S/P D&C (STATUS POST DILATION AND CURETTAGE): Primary | ICD-10-CM

## 2018-08-21 DIAGNOSIS — Z98.890 S/P D&C (STATUS POST DILATION AND CURETTAGE): ICD-10-CM

## 2018-08-21 DIAGNOSIS — O02.1 MISSED AB: ICD-10-CM

## 2018-08-21 PROBLEM — O20.9 VAGINAL BLEEDING AFFECTING EARLY PREGNANCY: Status: RESOLVED | Noted: 2018-08-13 | Resolved: 2018-08-21

## 2018-08-21 LAB
BASOPHILS # BLD AUTO: 0.03 K/UL
BASOPHILS NFR BLD: 0.4 %
DIFFERENTIAL METHOD: ABNORMAL
EOSINOPHIL # BLD AUTO: 0.2 K/UL
EOSINOPHIL NFR BLD: 2.2 %
ERYTHROCYTE [DISTWIDTH] IN BLOOD BY AUTOMATED COUNT: 14.3 %
HCG INTACT+B SERPL-ACNC: 293 MIU/ML
HCT VFR BLD AUTO: 25.2 %
HGB BLD-MCNC: 7.8 G/DL
LYMPHOCYTES # BLD AUTO: 3 K/UL
LYMPHOCYTES NFR BLD: 43.1 %
MCH RBC QN AUTO: 26.5 PG
MCHC RBC AUTO-ENTMCNC: 31 G/DL
MCV RBC AUTO: 86 FL
MONOCYTES # BLD AUTO: 0.8 K/UL
MONOCYTES NFR BLD: 12 %
NEUTROPHILS # BLD AUTO: 2.9 K/UL
NEUTROPHILS NFR BLD: 42 %
PLATELET # BLD AUTO: 649 K/UL
PMV BLD AUTO: 9.1 FL
RBC # BLD AUTO: 2.94 M/UL
WBC # BLD AUTO: 6.86 K/UL

## 2018-08-21 PROCEDURE — 85025 COMPLETE CBC W/AUTO DIFF WBC: CPT

## 2018-08-21 PROCEDURE — 36415 COLL VENOUS BLD VENIPUNCTURE: CPT

## 2018-08-21 PROCEDURE — 99213 OFFICE O/P EST LOW 20 MIN: CPT | Mod: S$PBB,,, | Performed by: NURSE PRACTITIONER

## 2018-08-21 PROCEDURE — 84702 CHORIONIC GONADOTROPIN TEST: CPT

## 2018-08-21 PROCEDURE — 99212 OFFICE O/P EST SF 10 MIN: CPT | Mod: PBBFAC | Performed by: NURSE PRACTITIONER

## 2018-08-21 PROCEDURE — 99999 PR PBB SHADOW E&M-EST. PATIENT-LVL II: CPT | Mod: PBBFAC,,, | Performed by: NURSE PRACTITIONER

## 2018-08-21 NOTE — PROGRESS NOTES
CC: D&C f/u    HPI: Pt is a 38 y.o.  female who presents for a f/u s/p D&C on 8/13/18 with Dr. Hill. Pt went to the ED on 8/12/18 with heavy vaginal bleeding. There was no heart beat upon ultrasound. Doing well today, has returned to work. Denies vaginal bleeding or pain. Plans to start trying again in the near future. Curious what her H&H is today, did receive a blood transfusion in the hospital.     EXAMINATION:  US OB LESS THAN 14 WKS WITH TRANSVAGINAL (XPD)    CLINICAL HISTORY:  Vag Bleeding;    TECHNIQUE:  Transabdominal sonography of the pelvis was performed.  Transvaginal images were deferred at patient request.    COMPARISON:  Ultrasound pelvis 02/15/2018.    FINDINGS:  Evaluation is significantly limited an early pregnancy secondary to lack of transvaginal images.    There is a single gestational sac identified in the lower uterine segment with mean sac size of 2.5 cm.  No embryo or fetal heart rate is identified on transabdominal images.    The cervix appears closed.    The right ovary is unremarkable, measuring 2.9 x 2.4 x 2.3 cm.  Left ovary is not definitively visualized.  There is no significant free fluid in the pelvis.      Impression       Limited examination due to absence of transvaginal images, noting the presence of a single gestational sac within the lower uterine segment.  No embryo or fetal heart rate identified on transabdominal images.  Findings could represent a failed or failing pregnancy in the setting of vaginal bleeding.  Recommend correlation with clinical findings, beta HCG level, and short-term follow-up.         ROS:  GENERAL: Feeling well overall. Denies fever or chills.   SKIN: Denies rash or lesions.   HEAD: Denies head injury or headache.   NODES: Denies enlarged lymph nodes.   CHEST: Denies chest pain or shortness of breath.   CARDIOVASCULAR: Denies palpitations or left sided chest pain.   ABDOMEN: No abdominal pain, constipation, diarrhea, nausea, vomiting or rectal  bleeding.   URINARY: No dysuria, hematuria, or burning on urination.  REPRODUCTIVE: See HPI.   BREASTS: Denies pain, lumps, or nipple discharge.   HEMATOLOGIC: No easy bruisability or excessive bleeding.   MUSCULOSKELETAL: Denies joint pain or swelling.   NEUROLOGIC: Denies syncope or weakness.   PSYCHIATRIC: Denies depression, anxiety or mood swings.    PE:   APPEARANCE: Well nourished, well developed, Black or  female in no acute distress.  PELVIC: deferred-talk only    Diagnosis:  1. Missed Ab  S/p D&C       Plan:     Orders Placed This Encounter    hCG, quantitative      Follow up hCG and CBC today, encouraged to start prenatal vitamin  Discussed to call and RTC if  Has a Fever above 100.4°F or chills, bright red vaginal bleeding that soaks more than 1 pad per hour, or a foul smelling discharge  Discussed Naproxen PRN discomfort.

## 2019-06-13 ENCOUNTER — TELEPHONE (OUTPATIENT)
Dept: OBSTETRICS AND GYNECOLOGY | Facility: CLINIC | Age: 39
End: 2019-06-13

## 2019-06-13 ENCOUNTER — PATIENT MESSAGE (OUTPATIENT)
Dept: OBSTETRICS AND GYNECOLOGY | Facility: CLINIC | Age: 39
End: 2019-06-13

## 2019-06-13 DIAGNOSIS — A60.09 HERPES GENITALIS IN WOMEN: Primary | ICD-10-CM

## 2019-06-13 NOTE — TELEPHONE ENCOUNTER
Patient requesting additional refills of her Valacylovir 500 mg medication. Can you refill for her?

## 2019-06-14 RX ORDER — VALACYCLOVIR HYDROCHLORIDE 500 MG/1
1000 TABLET, FILM COATED ORAL DAILY
Qty: 30 TABLET | Refills: 11 | Status: SHIPPED | OUTPATIENT
Start: 2019-06-14 | End: 2022-02-01

## 2020-04-21 DIAGNOSIS — Z01.84 ANTIBODY RESPONSE EXAMINATION: ICD-10-CM

## 2020-04-28 ENCOUNTER — LAB VISIT (OUTPATIENT)
Dept: LAB | Facility: HOSPITAL | Age: 40
End: 2020-04-28
Attending: INTERNAL MEDICINE
Payer: COMMERCIAL

## 2020-04-28 DIAGNOSIS — Z01.84 ANTIBODY RESPONSE EXAMINATION: ICD-10-CM

## 2020-04-28 LAB — SARS-COV-2 IGG SERPL QL IA: NEGATIVE

## 2020-04-28 PROCEDURE — 86769 SARS-COV-2 COVID-19 ANTIBODY: CPT

## 2020-04-28 PROCEDURE — 36415 COLL VENOUS BLD VENIPUNCTURE: CPT

## 2020-05-12 ENCOUNTER — TELEPHONE (OUTPATIENT)
Dept: PHARMACY | Facility: CLINIC | Age: 40
End: 2020-05-12

## 2020-05-12 NOTE — TELEPHONE ENCOUNTER
Patient's , Naveed, reached in regards to Menopur RX - as patient requested at [phone]. Several prescriptions were originally called in but [facility] called back asking to disregards ALL Rxs, except for Menopur. Naveed informed that Menopur is $108 per vial at [facility], with insurance. He explained that he has  and already established care with [facility] in [place] where Menopur is $77/vial for them. Retail cost is $82.50, but they are preexisting customers so were given additional discount. He also gets Gonal covered through  - they will cover the cost of ONE fertility medication. He confirms that he would like to continue to fill with [facility] for the cheaper cost, [facility] to discharge from our services. [email or web address]

## 2021-01-29 ENCOUNTER — OFFICE VISIT (OUTPATIENT)
Dept: FAMILY MEDICINE | Facility: CLINIC | Age: 41
End: 2021-01-29
Payer: COMMERCIAL

## 2021-01-29 VITALS
DIASTOLIC BLOOD PRESSURE: 82 MMHG | SYSTOLIC BLOOD PRESSURE: 112 MMHG | RESPIRATION RATE: 18 BRPM | WEIGHT: 174.81 LBS | OXYGEN SATURATION: 97 % | HEIGHT: 63 IN | HEART RATE: 99 BPM | TEMPERATURE: 98 F | BODY MASS INDEX: 30.97 KG/M2

## 2021-01-29 DIAGNOSIS — Z80.9 FAMILY HISTORY OF CANCER: ICD-10-CM

## 2021-01-29 DIAGNOSIS — Z11.59 NEED FOR HEPATITIS C SCREENING TEST: ICD-10-CM

## 2021-01-29 DIAGNOSIS — Z12.31 BREAST CANCER SCREENING BY MAMMOGRAM: ICD-10-CM

## 2021-01-29 DIAGNOSIS — Z11.4 SCREENING FOR HIV (HUMAN IMMUNODEFICIENCY VIRUS): ICD-10-CM

## 2021-01-29 DIAGNOSIS — E55.9 VITAMIN D DEFICIENCY: ICD-10-CM

## 2021-01-29 DIAGNOSIS — E66.9 CLASS 1 OBESITY WITH BODY MASS INDEX (BMI) OF 30.0 TO 30.9 IN ADULT, UNSPECIFIED OBESITY TYPE, UNSPECIFIED WHETHER SERIOUS COMORBIDITY PRESENT: Primary | ICD-10-CM

## 2021-01-29 PROCEDURE — 99999 PR PBB SHADOW E&M-EST. PATIENT-LVL IV: ICD-10-PCS | Mod: PBBFAC,,, | Performed by: NURSE PRACTITIONER

## 2021-01-29 PROCEDURE — 99386 PR PREVENTIVE VISIT,NEW,40-64: ICD-10-PCS | Mod: S$GLB,,, | Performed by: NURSE PRACTITIONER

## 2021-01-29 PROCEDURE — 99386 PREV VISIT NEW AGE 40-64: CPT | Mod: S$GLB,,, | Performed by: NURSE PRACTITIONER

## 2021-01-29 PROCEDURE — 99999 PR PBB SHADOW E&M-EST. PATIENT-LVL IV: CPT | Mod: PBBFAC,,, | Performed by: NURSE PRACTITIONER

## 2021-01-29 PROCEDURE — 99214 OFFICE O/P EST MOD 30 MIN: CPT | Mod: PBBFAC,PN | Performed by: NURSE PRACTITIONER

## 2021-01-29 RX ORDER — PHENTERMINE HYDROCHLORIDE 37.5 MG/1
37.5 CAPSULE ORAL EVERY MORNING
COMMUNITY

## 2021-02-01 ENCOUNTER — PATIENT MESSAGE (OUTPATIENT)
Dept: FAMILY MEDICINE | Facility: CLINIC | Age: 41
End: 2021-02-01

## 2021-04-16 ENCOUNTER — PATIENT MESSAGE (OUTPATIENT)
Dept: RESEARCH | Facility: HOSPITAL | Age: 41
End: 2021-04-16

## 2021-05-26 ENCOUNTER — OFFICE VISIT (OUTPATIENT)
Dept: URGENT CARE | Facility: CLINIC | Age: 41
End: 2021-05-26
Payer: OTHER GOVERNMENT

## 2021-05-26 VITALS
OXYGEN SATURATION: 97 % | SYSTOLIC BLOOD PRESSURE: 119 MMHG | DIASTOLIC BLOOD PRESSURE: 75 MMHG | HEIGHT: 63 IN | WEIGHT: 168 LBS | HEART RATE: 98 BPM | TEMPERATURE: 98 F | RESPIRATION RATE: 18 BRPM | BODY MASS INDEX: 29.77 KG/M2

## 2021-05-26 DIAGNOSIS — S99.911A RIGHT ANKLE INJURY, INITIAL ENCOUNTER: Primary | ICD-10-CM

## 2021-05-26 DIAGNOSIS — S82.831A CLOSED FRACTURE OF DISTAL END OF RIGHT FIBULA, UNSPECIFIED FRACTURE MORPHOLOGY, INITIAL ENCOUNTER: ICD-10-CM

## 2021-05-26 PROCEDURE — 99214 PR OFFICE/OUTPT VISIT, EST, LEVL IV, 30-39 MIN: ICD-10-PCS | Mod: S$GLB,,, | Performed by: NURSE PRACTITIONER

## 2021-05-26 PROCEDURE — 73610 X-RAY EXAM OF ANKLE: CPT | Mod: FY,RT,S$GLB, | Performed by: RADIOLOGY

## 2021-05-26 PROCEDURE — 99214 OFFICE O/P EST MOD 30 MIN: CPT | Mod: S$GLB,,, | Performed by: NURSE PRACTITIONER

## 2021-05-26 PROCEDURE — 73610 XR ANKLE COMPLETE 3 VIEW RIGHT: ICD-10-PCS | Mod: FY,RT,S$GLB, | Performed by: RADIOLOGY

## 2021-05-26 RX ORDER — TRAMADOL HYDROCHLORIDE 50 MG/1
50 TABLET ORAL EVERY 6 HOURS PRN
Qty: 20 TABLET | Refills: 0 | Status: SHIPPED | OUTPATIENT
Start: 2021-05-26 | End: 2022-02-01

## 2021-05-28 ENCOUNTER — TELEPHONE (OUTPATIENT)
Dept: URGENT CARE | Facility: CLINIC | Age: 41
End: 2021-05-28

## 2021-12-21 ENCOUNTER — HOSPITAL ENCOUNTER (EMERGENCY)
Facility: HOSPITAL | Age: 41
Discharge: HOME | End: 2021-12-22
Attending: EMERGENCY MEDICINE
Payer: OTHER GOVERNMENT
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
## 2021-12-21 DIAGNOSIS — S61.412A LACERATION OF LEFT HAND WITHOUT FOREIGN BODY, INITIAL ENCOUNTER: ICD-10-CM

## 2021-12-21 PROCEDURE — 99283 EMERGENCY DEPT VISIT LOW MDM: CPT | Mod: 25

## 2021-12-21 PROCEDURE — 25000003 PHARM REV CODE 250: Performed by: NURSE PRACTITIONER

## 2021-12-21 PROCEDURE — 81025 URINE PREGNANCY TEST: CPT | Performed by: NURSE PRACTITIONER

## 2021-12-21 PROCEDURE — 25000003 PHARM REV CODE 250: Performed by: PHYSICIAN ASSISTANT

## 2021-12-21 PROCEDURE — 12001 RPR S/N/AX/GEN/TRNK 2.5CM/<: CPT

## 2021-12-21 RX ORDER — LIDOCAINE HYDROCHLORIDE 10 MG/ML
10 INJECTION INFILTRATION; PERINEURAL
Status: COMPLETED | OUTPATIENT
Start: 2021-12-21 | End: 2021-12-21

## 2021-12-21 RX ORDER — AMOXICILLIN AND CLAVULANATE POTASSIUM 875; 125 MG/1; MG/1
1 TABLET, FILM COATED ORAL
Status: COMPLETED | OUTPATIENT
Start: 2021-12-21 | End: 2021-12-21

## 2021-12-21 RX ADMIN — LIDOCAINE HYDROCHLORIDE 10 ML: 10 INJECTION, SOLUTION INFILTRATION; PERINEURAL at 11:12

## 2021-12-21 RX ADMIN — AMOXICILLIN AND CLAVULANATE POTASSIUM 1 TABLET: 875; 125 TABLET, FILM COATED ORAL at 11:12

## 2021-12-22 VITALS
HEART RATE: 80 BPM | DIASTOLIC BLOOD PRESSURE: 78 MMHG | RESPIRATION RATE: 18 BRPM | BODY MASS INDEX: 28.17 KG/M2 | HEIGHT: 64 IN | SYSTOLIC BLOOD PRESSURE: 134 MMHG | TEMPERATURE: 98 F | WEIGHT: 165 LBS | OXYGEN SATURATION: 100 %

## 2021-12-22 LAB
B-HCG UR QL: NEGATIVE
CTP QC/QA: YES

## 2021-12-22 PROCEDURE — 12001 RPR S/N/AX/GEN/TRNK 2.5CM/<: CPT

## 2021-12-22 RX ORDER — AMOXICILLIN AND CLAVULANATE POTASSIUM 875; 125 MG/1; MG/1
1 TABLET, FILM COATED ORAL 2 TIMES DAILY
Qty: 14 TABLET | Refills: 0 | Status: SHIPPED | OUTPATIENT
Start: 2021-12-22 | End: 2021-12-29

## 2022-01-31 ENCOUNTER — PATIENT MESSAGE (OUTPATIENT)
Dept: FAMILY MEDICINE | Facility: CLINIC | Age: 42
End: 2022-01-31
Payer: OTHER GOVERNMENT

## 2022-02-01 ENCOUNTER — OFFICE VISIT (OUTPATIENT)
Dept: FAMILY MEDICINE | Facility: CLINIC | Age: 42
End: 2022-02-01
Payer: OTHER GOVERNMENT

## 2022-02-01 VITALS
TEMPERATURE: 99 F | WEIGHT: 171.94 LBS | OXYGEN SATURATION: 96 % | DIASTOLIC BLOOD PRESSURE: 76 MMHG | BODY MASS INDEX: 29.35 KG/M2 | HEIGHT: 64 IN | RESPIRATION RATE: 18 BRPM | HEART RATE: 89 BPM | SYSTOLIC BLOOD PRESSURE: 110 MMHG

## 2022-02-01 DIAGNOSIS — Z02.1 PHYSICAL EXAM, PRE-EMPLOYMENT: Primary | ICD-10-CM

## 2022-02-01 DIAGNOSIS — S46.811A STRAIN OF RIGHT TRAPEZIUS MUSCLE, INITIAL ENCOUNTER: ICD-10-CM

## 2022-02-01 PROCEDURE — 99213 OFFICE O/P EST LOW 20 MIN: CPT | Mod: PBBFAC,PN | Performed by: NURSE PRACTITIONER

## 2022-02-01 PROCEDURE — 99214 OFFICE O/P EST MOD 30 MIN: CPT | Mod: S$PBB,,, | Performed by: NURSE PRACTITIONER

## 2022-02-01 PROCEDURE — 99999 PR PBB SHADOW E&M-EST. PATIENT-LVL III: ICD-10-PCS | Mod: PBBFAC,,, | Performed by: NURSE PRACTITIONER

## 2022-02-01 PROCEDURE — 99214 PR OFFICE/OUTPT VISIT, EST, LEVL IV, 30-39 MIN: ICD-10-PCS | Mod: S$PBB,,, | Performed by: NURSE PRACTITIONER

## 2022-02-01 PROCEDURE — 99999 PR PBB SHADOW E&M-EST. PATIENT-LVL III: CPT | Mod: PBBFAC,,, | Performed by: NURSE PRACTITIONER

## 2022-02-01 RX ORDER — MELOXICAM 15 MG/1
15 TABLET ORAL DAILY PRN
Qty: 30 TABLET | Refills: 0 | OUTPATIENT
Start: 2022-02-01 | End: 2022-07-16

## 2022-02-01 RX ORDER — TIZANIDINE 4 MG/1
4 TABLET ORAL NIGHTLY PRN
Qty: 30 TABLET | Refills: 0 | Status: SHIPPED | OUTPATIENT
Start: 2022-02-01 | End: 2022-02-11

## 2022-02-01 NOTE — PROGRESS NOTES
Routine Office Visit    Patient Name: Sreedhar Tyler    : 1980  MRN: [id number]    Chief Complaint:  Physical, neck pain    Subjective:  Sreedhar is a 41 y.o. female who presents today for:    Physical, neck pain - patient who is known to me who is a [facility] the [facility] reports today for evaluation.  She needs just a physical today for her job and she states she will be traveling on an assignment as well.  She states that she can do her job fully without any problems.  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  She notes pain at the right trapezius which radiates up to the neck and down to the right shoulder, but not past the right shoulder.  She denies any numbness, tingling, or weakness of the upper extremities.  She states she can move her neck with full range of motion but it is painful.  No decreased range of motion of the shoulder.  She has been taking extra-strength Tylenol with only modest benefit at best.    She has her mammogram scheduled already.  She states she will contact her OB GYNs office to schedule a Pap smear.  She states she has gotten her flu shot already and plans on getting the COVID booster as well.    This is the extent of her concerns at this time.    Past Medical History  Past Medical History:   Diagnosis Date    Adult ADHD 2013    Asthma     History of blood transfusion 08/15/2018    Ovarian cyst        Past Surgical History  Past Surgical History:   Procedure Laterality Date    DILATION AND CURETTAGE OF UTERUS USING SUCTION N/A 2018    Procedure: DILATION AND CURETTAGE, UTERUS, USING SUCTION;  Surgeon: Carmen Hill MD;  Location: [facility];  Service: OB/GYN;  Laterality: N/A;    OVARIAN CYST REMOVAL      TONSILLECTOMY         Family History  Family History   Problem Relation Age of Onset    Cancer Father     Ulcerative colitis Brother     Breast cancer Neg Hx     Colon cancer Neg  Hx     Ovarian cancer Neg Hx        Social History  Social History     Socioeconomic History    Marital status:    Tobacco Use    Smoking status: Never Smoker    Smokeless tobacco: Never Used   Substance and Sexual Activity    Alcohol use: Yes     Comment: occassionally    Drug use: No    Sexual activity: Yes     Partners: Male     Birth control/protection: None   Social History Narrative    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       Current Medications  Current Outpatient Medications on File Prior to Visit   Medication Sig Dispense Refill    phentermine 37.5 MG capsule Take 37.5 mg by mouth every morning.      traMADoL (ULTRAM) 50 mg tablet Take 1 tablet (50 mg total) by mouth every 6 (six) hours as needed for Pain (Take off duty only.). 20 tablet 0    valACYclovir (VALTREX) 500 MG tablet Take 2 tablets (1,000 mg total) by mouth once daily. 30 tablet 11     No current facility-administered medications on file prior to visit.       Allergies   Review of patient's allergies indicates:   Allergen Reactions    Azithromycin Rash    Doryx [doxycycline hyclate] Rash    Phenergan plain Palpitations    Tetracyclines Rash       Review of Systems (Pertinent positives)  Review of Systems   Constitutional: Negative.  Negative for chills, fever and weight loss.   HENT: Negative.    Eyes: Negative.    Respiratory: Negative for cough, hemoptysis, sputum production, shortness of breath and wheezing.    Cardiovascular: Negative for chest pain, palpitations, orthopnea, claudication, leg swelling and PND.   Gastrointestinal: Negative.    Genitourinary: Negative.    Musculoskeletal: Positive for neck pain. Negative for back pain, falls, joint pain and myalgias.   Skin: Negative.    Neurological: Negative for dizziness, tingling, tremors and headaches.   Endo/Heme/Allergies: Negative.    Psychiatric/Behavioral: Negative.        /76 (BP Location: Left arm, Patient Position: Sitting, BP Method: Medium  "(Manual))   Pulse 89   Temp 98.5 °F (36.9 °C) (Oral)   Resp 18   Ht 5' 4" (1.626 m)   Wt 78 kg (171 lb 15.3 oz)   SpO2 96%   BMI 29.52 kg/m²     Physical Exam  Vitals reviewed.   Constitutional:       General: She is not in acute distress.     Appearance: Normal appearance. She is not ill-appearing, toxic-appearing or diaphoretic.   HENT:      Head: Normocephalic and atraumatic.      Right Ear: Tympanic membrane, ear canal and external ear normal.      Left Ear: Tympanic membrane, ear canal and external ear normal.      Nose: Nose normal. No congestion or rhinorrhea.      Mouth/Throat:      Mouth: Mucous membranes are moist.      Pharynx: Oropharynx is clear. No oropharyngeal exudate or posterior oropharyngeal erythema.   Eyes:      Extraocular Movements: Extraocular movements intact.      Conjunctiva/sclera: Conjunctivae normal.      Pupils: Pupils are equal, round, and reactive to light.   Neck:      Comments: Patient does report right-sided trapezius pain with neck movements, however there is no muscular or bony tenderness  Cardiovascular:      Rate and Rhythm: Normal rate and regular rhythm.      Pulses: Normal pulses.      Heart sounds: Normal heart sounds.   Pulmonary:      Effort: Pulmonary effort is normal.      Breath sounds: Normal breath sounds.   Abdominal:      General: Abdomen is flat. Bowel sounds are normal. There is no distension.      Palpations: Abdomen is soft. There is no mass.      Tenderness: There is no abdominal tenderness.   Musculoskeletal:         General: No swelling or tenderness. Normal range of motion.      Cervical back: Normal range of motion and neck supple. No swelling, edema, deformity, erythema, rigidity, spasms, tenderness, bony tenderness or crepitus. Pain with movement present.   Skin:     General: Skin is warm and dry.      Capillary Refill: Capillary refill takes less than 2 seconds.   Neurological:      General: No focal deficit present.      Mental Status: She is " alert and oriented to person, place, and time.   Psychiatric:         Mood and Affect: Mood normal.         Behavior: Behavior normal.          Assessment/Plan:  Sreedhar Tyler is a 41 y.o. female who presents today for :    Diagnoses and all orders for this visit:    Physical exam, pre-employment    Strain of right trapezius muscle, initial encounter  -     tiZANidine (ZANAFLEX) 4 MG tablet; Take 1 tablet (4 mg total) by mouth nightly as needed (neck pain).  -     meloxicam (MOBIC) 15 MG tablet; Take 1 tablet (15 mg total) by mouth daily as needed for Pain.    Physical form filled out for patient.  No need for neck imaging today.  No alarm signs or symptoms.  Patient states she feels like she can do her job despite the muscle strain.  Will treat with meloxicam and nightly tizanidine as she states the pain is somewhat worse at night.  Patient was educated regarding the side effects of NSAIDs, including GI ulceration, kidney dysfunction, GI bleeding, and stomach upset.  Patient was instructed to stop the medication and call the clinic if the patient experiences any stomach upset, black tarry stools, bloody stools, or vomiting while taking this medication.  Patient was educated regarding the side effects of muscle relaxers, including drowsiness, dizziness, and sleepiness.  The patient was educated to not drive or operate heavy machinery until the patient knows how this medication affects him/her.  Offer to check labs with patient declined at this time.  All questions answered.  Follow-up as needed.        This office note has been dictated.  This dictation has been generated using M-Modal Fluency Direct dictation; some phonetic errors may occur.   My collaborating physician is Dr. Omar Najera.

## 2022-07-16 ENCOUNTER — HOSPITAL ENCOUNTER (EMERGENCY)
Facility: HOSPITAL | Age: 42
Discharge: HOME | End: 2022-07-16
Attending: STUDENT IN AN ORGANIZED HEALTH CARE EDUCATION/TRAINING PROGRAM
Payer: OTHER GOVERNMENT

## 2022-07-16 VITALS
HEIGHT: 64 IN | RESPIRATION RATE: 19 BRPM | TEMPERATURE: 98 F | SYSTOLIC BLOOD PRESSURE: 121 MMHG | HEART RATE: 80 BPM | OXYGEN SATURATION: 98 % | BODY MASS INDEX: 26.46 KG/M2 | WEIGHT: 155 LBS | DIASTOLIC BLOOD PRESSURE: 82 MMHG

## 2022-07-16 DIAGNOSIS — M54.12 CERVICAL RADICULOPATHY: ICD-10-CM

## 2022-07-16 DIAGNOSIS — S16.1XXA CERVICAL STRAIN, ACUTE, INITIAL ENCOUNTER: Primary | ICD-10-CM

## 2022-07-16 LAB
B-HCG UR QL: NEGATIVE
CTP QC/QA: YES

## 2022-07-16 PROCEDURE — 81025 URINE PREGNANCY TEST: CPT | Mod: ER | Performed by: NURSE PRACTITIONER

## 2022-07-16 PROCEDURE — 63600175 PHARM REV CODE 636 W HCPCS: Mod: ER | Performed by: NURSE PRACTITIONER

## 2022-07-16 PROCEDURE — 96372 THER/PROPH/DIAG INJ SC/IM: CPT | Performed by: NURSE PRACTITIONER

## 2022-07-16 PROCEDURE — 99284 EMERGENCY DEPT VISIT MOD MDM: CPT | Mod: 25,ER

## 2022-07-16 RX ORDER — DIAZEPAM 10 MG/2ML
5 INJECTION INTRAMUSCULAR
Status: COMPLETED | OUTPATIENT
Start: 2022-07-16 | End: 2022-07-16

## 2022-07-16 RX ORDER — DIAZEPAM 5 MG/1
5 TABLET ORAL NIGHTLY PRN
Qty: 5 TABLET | Refills: 0 | Status: SHIPPED | OUTPATIENT
Start: 2022-07-16 | End: 2023-05-05

## 2022-07-16 RX ORDER — LIDOCAINE 50 MG/G
1 PATCH TOPICAL DAILY
Qty: 15 PATCH | Refills: 0 | Status: SHIPPED | OUTPATIENT
Start: 2022-07-16 | End: 2022-07-31

## 2022-07-16 RX ORDER — KETOROLAC TROMETHAMINE 30 MG/ML
30 INJECTION, SOLUTION INTRAMUSCULAR; INTRAVENOUS
Status: COMPLETED | OUTPATIENT
Start: 2022-07-16 | End: 2022-07-16

## 2022-07-16 RX ORDER — KETOROLAC TROMETHAMINE 10 MG/1
10 TABLET, FILM COATED ORAL 3 TIMES DAILY
Qty: 15 TABLET | Refills: 0 | Status: SHIPPED | OUTPATIENT
Start: 2022-07-16 | End: 2022-07-21

## 2022-07-16 RX ORDER — DEXTROMETHORPHAN HYDROBROMIDE, GUAIFENESIN 5; 100 MG/5ML; MG/5ML
650 LIQUID ORAL EVERY 8 HOURS
Qty: 20 TABLET | Refills: 0 | Status: SHIPPED | OUTPATIENT
Start: 2022-07-16 | End: 2022-07-21

## 2022-07-16 RX ADMIN — DIAZEPAM 5 MG: 10 INJECTION, SOLUTION INTRAMUSCULAR; INTRAVENOUS at 04:07

## 2022-07-16 RX ADMIN — KETOROLAC TROMETHAMINE 30 MG: 30 INJECTION, SOLUTION INTRAMUSCULAR at 04:07

## 2022-07-16 NOTE — ED PROVIDER NOTES
Encounter Date: 7/16/2022    SCRIBE #1 NOTE: I, Court Long, am scribing for, and in the presence of,  JASON Lincoln. I have scribed the following portions of the note - Other sections scribed: HPI; ROS; PE.       History     Chief Complaint   Patient presents with    Neck Pain     Patient presents to ED c/o Neck pain, pt reports neck pain 7/10 at rest 9/10 with activity x 5 days, patients states pain radiates to left shoulder.   Denies loss of ROM of neck and shoulder, trauma,n/v/d, fever chills  Denies Med Hx, [facility] at [facility]  Reports taking muscle relaxer, warm compress, mortrin with no relief.      Sreedhar Tyler is a 41 y.o. female with Hx of HTN who presents to the ED for chief complaint of worsening shooting pain radiating from the neck into the L shoulder with L upper back pain onset 5 days ago. She states that she has full ROM but with pain. Patient states that she is unable to find a comfortable position. She denies rash, fever, chest pain, SOB, numbness, weakness, tingling, abdominal pain, dysuria, hematuria, nausea, vomiting, diarrhea, or any other complaints.  Patient rates the pain as 7/10 and has taken Motrin at 9 AM and Robaxin for the symptoms with no relief. She is allergic to Doxy, Azithromycin, tetracyclines, and Phenergan. Patient denies tobacco, alcohol, or illicit drug use. No alleviating/aggravating factors.     The history is provided by the patient. No  was used.     Review of patient's allergies indicates:   Allergen Reactions    Azithromycin Rash    Doryx [doxycycline hyclate] Rash    Phenergan plain Palpitations    Tetracyclines Rash     Past Medical History:   Diagnosis Date    Adult ADHD 8/27/2013    Asthma     History of blood transfusion 08/15/2018    Ovarian cyst      Past Surgical History:   Procedure Laterality Date    DILATION AND CURETTAGE OF UTERUS USING SUCTION N/A 8/13/2018    Procedure: DILATION AND CURETTAGE, UTERUS, USING SUCTION;  Surgeon:  Carmen Hill MD;  Location: [facility] OR;  Service: OB/GYN;  Laterality: N/A;    OVARIAN CYST REMOVAL      TONSILLECTOMY       Family History   Problem Relation Age of Onset    Cancer Father     Ulcerative colitis Brother     Breast cancer Neg Hx     Colon cancer Neg Hx     Ovarian cancer Neg Hx      Social History     Tobacco Use    Smoking status: Never Smoker    Smokeless tobacco: Never Used   Substance Use Topics    Alcohol use: Yes     Comment: occassionally    Drug use: No     Review of Systems   Constitutional: Negative for chills and fever.   HENT: Negative for congestion, ear pain, rhinorrhea, sore throat and trouble swallowing.    Eyes: Negative for pain, discharge and redness.   Respiratory: Negative for cough and shortness of breath.    Cardiovascular: Negative for chest pain.   Gastrointestinal: Negative for abdominal pain, diarrhea, nausea and vomiting.   Genitourinary: Negative for decreased urine volume and dysuria.   Musculoskeletal: Positive for arthralgias, back pain and neck pain. Negative for neck stiffness.   Skin: Negative for rash.   Neurological: Negative for dizziness, weakness, light-headedness, numbness and headaches.   Psychiatric/Behavioral: Negative for confusion.       Physical Exam     Initial Vitals [07/16/22 1532]   BP Pulse Resp Temp SpO2   127/89 61 (!) 22 98.5 °F (36.9 °C) 98 %      MAP       --         Physical Exam    Nursing note and vitals reviewed.  Constitutional: Vital signs are normal. She appears well-developed.  Non-toxic appearance. She does not appear ill.   HENT:   Head: Normocephalic and atraumatic.   Right Ear: External ear normal.   Left Ear: External ear normal.   Nose: Nose normal.   Mouth/Throat: Oropharynx is clear and moist.   Eyes: Conjunctivae are normal.   Neck:   Normal range of motion.  Cardiovascular: Normal rate, regular rhythm, normal heart sounds and intact distal pulses. Exam reveals no gallop and no friction rub.    No murmur  heard.  Pulses:       Radial pulses are 2+ on the right side and 2+ on the left side.   Pulmonary/Chest: Effort normal and breath sounds normal. No respiratory distress. She has no wheezes. She has no rhonchi. She has no rales. She exhibits no tenderness.   Abdominal: Abdomen is soft. There is no abdominal tenderness.   Musculoskeletal:      Cervical back: Normal range of motion. Spasms and tenderness present. Muscular tenderness present. Normal range of motion.      Comments: Tenderness to L trapezius and L paraspinal muscles in the cervical region with spasm. No erythema, bruising, or rash. Normal strength, sensation, and ROM; +2 radial pulse     Neurological: She is alert and oriented to person, place, and time. Gait normal. GCS eye subscore is 4. GCS verbal subscore is 5. GCS motor subscore is 6.   Skin: Skin is warm, dry and intact. No rash noted.   Psychiatric: She has a normal mood and affect. Her speech is normal and behavior is normal. Judgment and thought content normal.         ED Course   Procedures  Labs Reviewed   POCT URINE PREGNANCY          Imaging Results    None          Medications   ketorolac injection 30 mg (30 mg Intramuscular Given 7/16/22 1630)   diazePAM injection 5 mg (5 mg Intramuscular Given 7/16/22 1628)     Medical Decision Making:   History:   Old Medical Records: I decided to obtain old medical records.  Clinical Tests:   Lab Tests: Ordered and Reviewed       APC / Resident Notes:   This is an evaluation of a 41 y.o. female that presents to the Emergency Department for neck pain    Physical Exam shows a non-toxic, afebrile, and well appearing female. Tenderness to L trapezius and L paraspinal muscles in the cervical region with spasm. No erythema, bruising, or rash. Normal strength, sensation, and ROM; +2 radial pulse    Vital signs are reassuring. If available, previous records reviewed.   RESULTS: UPT negative; pain improved with treatment in the ED    My overall impression is  Cervical strain, cervical radiculopathy. I considered, but at this time, do not suspect fracture, dislocation, shingles, cellulitis.    ED Course: UPT, Valium and Toradol IM. Discharge Meds/Instructions: Toradol, Valium ,Tylenol, Lidoderm. The diagnosis, treatment plan, instructions for follow-up as well as ED return precautions were discussed. All questions have been answered.          Scribe Attestation:   Scribe #1: I performed the above scribed service and the documentation accurately describes the services I performed. I attest to the accuracy of the note.               I, JASON Lincoln, personally performed the services described in this documentation.  All medical record entries made by the scribe were at my direction and in my presence.  I have reviewed the chart and agree that the record reflects my personal performance and is accurate and complete.  Clinical Impression:   Final diagnoses:  [S16.1XXA] Cervical strain, acute, initial encounter (Primary)  [M54.12] Cervical radiculopathy          ED Disposition Condition    Discharge Stable        ED Prescriptions     Medication Sig Dispense Start Date End Date Auth. Provider    diazePAM (VALIUM) 5 MG tablet Take 1 tablet (5 mg total) by mouth nightly as needed (muscle spasms). 5 tablet 7/16/2022 7/21/2022 RICHARD Sawant    acetaminophen (TYLENOL) 650 MG TbSR Take 1 tablet (650 mg total) by mouth every 8 (eight) hours. for 5 days 20 tablet 7/16/2022 7/21/2022 RICHARD Sawant    ketorolac (TORADOL) 10 mg tablet Take 1 tablet (10 mg total) by mouth 3 (three) times daily. for 5 days 15 tablet 7/16/2022 7/21/2022 RICHARD Sawant    LIDOcaine (LIDODERM) 5 % Place 1 patch onto the skin once daily. Remove & Discard patch within 12 hours or as directed by MD, remove prior to bedtime for 15 days 15 patch 7/16/2022 7/31/2022 RICHARD Sawant        Follow-up Information     Follow up With Specialties Details Why Contact Info    Kevyn Mcintosh MD Family  Medicine Schedule an appointment as soon as possible for a visit in 2 days  [address] [facility] [address]  [phone]      [facility] ED Emergency Medicine Go to  If symptoms worsen [address] [facility] [address] [phone]           Shahrzad Perera, RICHARD  07/16/22 2388

## 2022-07-16 NOTE — Clinical Note
"Sreedhar Gayle (Jamie)e was seen and treated in our emergency department on 7/16/2022.  She may return to work on 07/18/2022.       If you have any questions or concerns, please don't hesitate to call.      RICHARD Sawant"

## 2022-09-14 DIAGNOSIS — Z12.31 OTHER SCREENING MAMMOGRAM: ICD-10-CM

## 2022-09-19 ENCOUNTER — PATIENT MESSAGE (OUTPATIENT)
Dept: ADMINISTRATIVE | Facility: HOSPITAL | Age: 42
End: 2022-09-19
Payer: OTHER GOVERNMENT

## 2023-05-05 ENCOUNTER — HOSPITAL ENCOUNTER (OUTPATIENT)
Dept: RADIOLOGY | Facility: HOSPITAL | Age: 43
Discharge: HOME | End: 2023-05-05
Attending: FAMILY MEDICINE
Payer: OTHER GOVERNMENT

## 2023-05-05 ENCOUNTER — OFFICE VISIT (OUTPATIENT)
Dept: OBSTETRICS AND GYNECOLOGY | Facility: CLINIC | Age: 43
End: 2023-05-05
Payer: OTHER GOVERNMENT

## 2023-05-05 VITALS
WEIGHT: 142.19 LBS | DIASTOLIC BLOOD PRESSURE: 70 MMHG | SYSTOLIC BLOOD PRESSURE: 100 MMHG | BODY MASS INDEX: 24.28 KG/M2 | HEIGHT: 64 IN

## 2023-05-05 DIAGNOSIS — Z12.31 OTHER SCREENING MAMMOGRAM: ICD-10-CM

## 2023-05-05 DIAGNOSIS — N94.6 DYSMENORRHEA: ICD-10-CM

## 2023-05-05 DIAGNOSIS — Z01.419 WELL FEMALE EXAM WITH ROUTINE GYNECOLOGICAL EXAM: Primary | ICD-10-CM

## 2023-05-05 PROCEDURE — 99999 PR PBB SHADOW E&M-EST. PATIENT-LVL III: CPT | Mod: PBBFAC,,, | Performed by: OBSTETRICS & GYNECOLOGY

## 2023-05-05 PROCEDURE — 99386 PREV VISIT NEW AGE 40-64: CPT | Mod: S$PBB,,, | Performed by: OBSTETRICS & GYNECOLOGY

## 2023-05-05 PROCEDURE — 77067 SCR MAMMO BI INCL CAD: CPT | Mod: 26,,, | Performed by: RADIOLOGY

## 2023-05-05 PROCEDURE — 77067 MAMMO DIGITAL SCREENING BILAT WITH TOMO: ICD-10-PCS | Mod: 26,,, | Performed by: RADIOLOGY

## 2023-05-05 PROCEDURE — 99386 PR PREVENTIVE VISIT,NEW,40-64: ICD-10-PCS | Mod: S$PBB,,, | Performed by: OBSTETRICS & GYNECOLOGY

## 2023-05-05 PROCEDURE — 77067 SCR MAMMO BI INCL CAD: CPT | Mod: TC

## 2023-05-05 PROCEDURE — 77063 MAMMO DIGITAL SCREENING BILAT WITH TOMO: ICD-10-PCS | Mod: 26,,, | Performed by: RADIOLOGY

## 2023-05-05 PROCEDURE — 99213 OFFICE O/P EST LOW 20 MIN: CPT | Mod: PBBFAC,PN | Performed by: OBSTETRICS & GYNECOLOGY

## 2023-05-05 PROCEDURE — 99999 PR PBB SHADOW E&M-EST. PATIENT-LVL III: ICD-10-PCS | Mod: PBBFAC,,, | Performed by: OBSTETRICS & GYNECOLOGY

## 2023-05-05 PROCEDURE — 88175 CYTOPATH C/V AUTO FLUID REDO: CPT | Performed by: OBSTETRICS & GYNECOLOGY

## 2023-05-05 PROCEDURE — 77063 BREAST TOMOSYNTHESIS BI: CPT | Mod: 26,,, | Performed by: RADIOLOGY

## 2023-05-05 RX ORDER — VALACYCLOVIR HYDROCHLORIDE 1 G/1
2000 TABLET, FILM COATED ORAL 2 TIMES DAILY
Qty: 30 TABLET | Refills: 1 | Status: SHIPPED | OUTPATIENT
Start: 2023-05-05 | End: 2023-05-06

## 2023-05-05 NOTE — PROGRESS NOTES
SUBJECTIVE:   42 y.o. female   for routine gyn exam. Patient's last menstrual period was 2023..  She has periods at normal intervals, but sometimes has bleeding lasting 2 weeks. Also has h/o of dysmenorrhea. Told she had fibroids during fertility tx.  Had failed IVF and ART, and declined donor eggs. Has not had fertility tx in 2 years.  H/o left endometrioma . Took OCP's in the past, but did not like SE.  Considering hysterectomy for pain and endometriosis. Desires Valtrex for oral HSV 1.         Past Medical History:   Diagnosis Date    Adult ADHD 2013    Asthma     Mild    History of blood transfusion 08/15/2018    HSV-1 infection      Past Surgical History:   Procedure Laterality Date    DILATION AND CURETTAGE OF UTERUS USING SUCTION N/A 2018    Procedure: DILATION AND CURETTAGE, UTERUS, USING SUCTION;  Surgeon: Carmen Hill MD;  Location: [facility];  Service: OB/GYN;  Laterality: N/A;    OVARIAN CYST REMOVAL Left     left endometrioma    TONSILLECTOMY       Social History     Socioeconomic History    Marital status:    Tobacco Use    Smoking status: Never    Smokeless tobacco: Never   Substance and Sexual Activity    Alcohol use: Yes     Comment: occassionally    Drug use: No    Sexual activity: Yes     Partners: Male     Birth control/protection: Other-see comments     Comment: Infertility- Failed IVF   Social History Narrative    Nurse     Family History   Problem Relation Age of Onset    Cancer Father         anal CA    Ulcerative colitis Brother     Breast cancer Neg Hx     Colon cancer Neg Hx     Ovarian cancer Neg Hx      OB History    Para Term  AB Living   3 1 1   2 1   SAB IAB Ectopic Multiple Live Births   1 1     1      # Outcome Date GA Lbr Phong/2nd Weight Sex Delivery Anes PTL Lv   3 SAB 2018           2 IAB            1 Term               Obstetric Comments   Missed AB with D&C 2018         Current Outpatient Medications   Medication Sig  "Dispense Refill    phentermine 37.5 MG capsule Take 37.5 mg by mouth every morning.      valACYclovir (VALTREX) 1000 MG tablet Take 2 tablets (2,000 mg total) by mouth 2 (two) times daily. for 1 day 30 tablet 1     No current facility-administered medications for this visit.     Allergies: Azithromycin, Doryx [doxycycline hyclate], Phenergan plain, and Tetracyclines     ROS:  Constitutional: no weight loss, weight gain, fever, fatigue  Eyes:  No vision changes, glasses/contacts  ENT/Mouth: No ulcers, sinus problems, ears ringing, headache  Cardiovascular: No inability to lie flat, chest pain, exercise intolerance, swelling, heart palpitations  Respiratory: No wheezing, coughing blood, shortness of breath, or cough  Gastrointestinal: No diarrhea, bloody stool, nausea/vomiting, constipation, gas, hemorrhoids  Genitourinary: No blood in urine, painful urination, urgency of urination, frequency of urination, incomplete emptying, incontinence, +abnormal bleeding, +painful periods, heavy periods, vaginal discharge, vaginal odor, painful intercourse, sexual problems, bleeding after intercourse.  Musculoskeletal: No muscle weakness  Skin/Breast: No painful breasts, nipple discharge, masses, rash, ulcers  Neurological: No passing out, seizures, numbness, headache  Endocrine: No diabetes, hypothyroid, hyperthyroid, hot flashes, hair loss, abnormal hair growth, acne  Psychiatric: No depression, crying  Hematologic: No bruises, bleeding, swollen lymph nodes, anemia.      OBJECTIVE:   The patient appears well, alert, oriented x 3, in no distress.  /70 (BP Location: Right arm, Patient Position: Sitting, BP Method: Medium (Manual))   Ht 5' 4" (1.626 m)   Wt 64.5 kg (142 lb 3.2 oz)   LMP 05/01/2023   BMI 24.41 kg/m²   NECK: no thyromegaly, trachea midline  SKIN: no acne, striae, hirsutism  CHEST: CTAB  CV: RRR  BREAST EXAM: breasts appear normal, no suspicious masses, no skin or nipple changes or axillary nodes  ABDOMEN: " no hernias, masses, or hepatosplenomegaly  GENITALIA: normal external genitalia, no erythema, no discharge  URETHRA: normal urethra, normal urethral meatus  VAGINA: Normal  CERVIX: no lesions or cervical motion tenderness  UTERUS: normal size, contour, position, consistency, mobility, non-tender  ADNEXA: no mass, fullness, tenderness      ASSESSMENT:   1. Well female exam with routine gynecological exam  Liquid-Based Pap Smear, Screening      2. Dysmenorrhea  US Pelvis Comp with Transvag NON-OB (xpd          PLAN:   Orders Placed This Encounter    US Pelvis Comp with Transvag NON-OB (xpd    Liquid-Based Pap Smear, Screening    valACYclovir (VALTREX) 1000 MG tablet     Discussed dysmenorrhea, endo on surgery 2015, options for tx. Did not do well with hormonal contraception. Consider Orilissa.  Discussed surgery with hysterectomy, possible supracervical if significant endo noted, benefit of BSO vs ovarian preservation. Desires ovarian conservation. Discussed potential persistent pain and possible reoperation for pain with BSO at a later date.  Discussed surgical menopause if BSO.  Plan u/s. If endometrioma noted, would consider ovarian removal.  F/u after u/s.  Discussed healthy lifestyle including regular exercise, healthy eating, etc.  Return to clinic in 1 year

## 2023-05-10 LAB
CLINICAL INFO: NORMAL
CYTO CVX: NORMAL
CYTOLOGIST CVX/VAG CYTO: NORMAL
CYTOLOGIST CVX/VAG CYTO: NORMAL
CYTOLOGY CMNT CVX/VAG CYTO-IMP: NORMAL
CYTOLOGY PAP THIN PREP EXPLANATION: NORMAL
DATE OF PREVIOUS PAP: NO
DATE PREVIOUS BX: NO
GEN CATEG CVX/VAG CYTO-IMP: NORMAL
LMP START DATE: NORMAL
MICROORGANISM CVX/VAG CYTO: NORMAL
PATHOLOGIST CVX/VAG CYTO: NORMAL
SERVICE CMNT-IMP: NORMAL
SPECIMEN SOURCE CVX/VAG CYTO: NORMAL
STAT OF ADQ CVX/VAG CYTO-IMP: NORMAL

## 2023-05-11 ENCOUNTER — HOSPITAL ENCOUNTER (OUTPATIENT)
Dept: RADIOLOGY | Facility: HOSPITAL | Age: 43
Discharge: HOME | End: 2023-05-11
Attending: OBSTETRICS & GYNECOLOGY
Payer: OTHER GOVERNMENT

## 2023-05-11 DIAGNOSIS — N94.6 DYSMENORRHEA: ICD-10-CM

## 2023-05-11 PROCEDURE — 76856 US EXAM PELVIC COMPLETE: CPT | Mod: 26,,, | Performed by: RADIOLOGY

## 2023-05-11 PROCEDURE — 76856 US EXAM PELVIC COMPLETE: CPT | Mod: TC

## 2023-05-11 PROCEDURE — 76856 US PELVIS COMPLETE NON OB: ICD-10-PCS | Mod: 26,,, | Performed by: RADIOLOGY

## 2023-05-12 ENCOUNTER — PATIENT MESSAGE (OUTPATIENT)
Dept: OBSTETRICS AND GYNECOLOGY | Facility: CLINIC | Age: 43
End: 2023-05-12

## 2023-05-12 PROBLEM — N94.6 DYSMENORRHEA: Status: ACTIVE | Noted: 2023-05-12

## 2025-06-29 NOTE — ED PROVIDER NOTES
Encounter Date: 2018    SCRIBE #1 NOTE: I, Lara Wilhelm, am scribing for, and in the presence of,  Tarik De Souza MD. I have scribed the following portions of the note - the Resident attestation.       History     Chief Complaint   Patient presents with    Tachycardia     Pt states she is having a miscarriage and having increase of bleeding. She states tachycardia, SOB, dizziness starting PTA.     Vaginal Bleeding     HPI   38F  at approximately 8 weeks 6 days with h/o ovarian cysts presenting for vaginal bleeding with clots and tissue since noon today. LMP 18. OB care for current pregnancy was to be established this week. ROS+ shortness of breath, pre-syncope/lightheadedness, pallor, and diaphoresis. Pt noted elevated heart rate of her wristwatch heart rate monitor. Denies chest pain but endorses palpitations. Denies abdominal trauma.    Review of patient's allergies indicates:   Allergen Reactions    Azithromycin Rash    Doryx [doxycycline hyclate] Rash    Phenergan plain Palpitations    Tetracyclines Rash     Past Medical History:   Diagnosis Date    Adult ADHD 2013    Asthma     Ovarian cyst      Past Surgical History:   Procedure Laterality Date    OVARIAN CYST REMOVAL      TONSILLECTOMY       Family History   Problem Relation Age of Onset    Breast cancer Neg Hx     Colon cancer Neg Hx     Ovarian cancer Neg Hx      Social History     Tobacco Use    Smoking status: Never Smoker   Substance Use Topics    Alcohol use: Yes     Comment: occassionally    Drug use: No     Review of Systems   Constitutional: Positive for diaphoresis. Negative for activity change, chills and fever.   HENT: Negative for congestion and sore throat.    Eyes: Negative for photophobia, redness and visual disturbance.   Respiratory: Positive for shortness of breath. Negative for choking and chest tightness.    Cardiovascular: Positive for palpitations. Negative for chest pain and leg swelling.    Gastrointestinal: Positive for nausea. Negative for abdominal distention, abdominal pain, anal bleeding and vomiting.   Genitourinary: Positive for vaginal bleeding. Negative for dysuria and flank pain.   Musculoskeletal: Negative for back pain and neck stiffness.   Skin: Positive for pallor. Negative for color change, rash and wound.   Neurological: Negative for seizures and facial asymmetry.   Psychiatric/Behavioral: Negative for agitation and hallucinations.       Physical Exam     Initial Vitals [18 2340]   BP Pulse Resp Temp SpO2   122/82 (!) 184 (!) 24 98 °F (36.7 °C) --      MAP       --         Physical Exam    Nursing note and vitals reviewed.  Constitutional: She appears well-developed and well-nourished. She is not diaphoretic. She appears distressed.   HENT:   Head: Normocephalic and atraumatic.   Eyes: EOM are normal. Pupils are equal, round, and reactive to light.   Conjunctival pallor   Neck: Normal range of motion. Neck supple. No thyromegaly present. No tracheal deviation present. No JVD present.   Cardiovascular: Regular rhythm and intact distal pulses.   Tachycardic EKG: sinus tach, no acute ST segment changes or TWI; QT wnl   Pulmonary/Chest: Breath sounds normal. No stridor. No respiratory distress. She has no wheezes. She has no rhonchi. She has no rales. She exhibits no tenderness.   tachypneic   Abdominal: Soft. Bowel sounds are normal.   Genitourinary:   Genitourinary Comments: Gross kim blood with relaxation of perineum on pelvic exam. Cervix difficult to visual due to clotting and tissue in fornix; appears boggy   Musculoskeletal: Normal range of motion. She exhibits no edema or tenderness.   Neurological: She is alert and oriented to person, place, and time.   Skin: Skin is dry. Capillary refill takes 2 to 3 seconds. There is pallor.   cool         ED Course   Procedures  Labs Reviewed - No data to display     MDM: 38 actively miscarrying; no free fluid noted on bedside  POCUS. Initial ddx included but was not limited to: sx anemia, active spontaneous , threatened , complete , hetertopic pregnancy, ectopic pregnancy; low suspicion of latter given active passage of fetal tissue.   Plan: CBC, CMP, type and cross, ABO, IVF  Dispo: anticipate transfer to [facility] if no need for OR  Shan Mtz MD  PGY-3 [facility]  2018 23:47    Update: CBC with down-trending H/H from 2015 at 11.4/33.1 from 14.0/42.6. CMP unremarkable. D/w GYN call and pt accepted for transfer to [facility] ED. If she can obtain TVUS prior to arrival of EMS then that would be ideal however this should not delay her transfer. Pt stable on re-assessment and ultrasound ready to taken patient; awaiting EMS arrival for transfer to [facility]. Will continue to monitor. Pt aware of plan.   Shan Mtz MD  PGY-3 [facility]  2018 00:53      Imaging Results    None          Medical Decision Making:   History:   Old Medical Records: I decided to obtain old medical records.  Clinical Tests:   Lab Tests: Ordered and Reviewed  Radiological Study: Ordered and Reviewed            Scribe Attestation:   Scribe #1: I performed the above scribed service and the documentation accurately describes the services I performed. I attest to the accuracy of the note.    Attending Attestation:   Physician Attestation Statement for Resident:  As the supervising MD   Physician Attestation Statement: I have personally seen and examined this patient.   I agree with the above history. -:   As the supervising MD I agree with the above PE.    As the supervising MD I agree with the above treatment, course, plan, and disposition.   -: Patient presents with vaginal bleeding. She thinks she is about 8 week pregnant. Her last period was in . She has not been seen by OB yet. She reports passing heavy clots and tissue. US does not show significant pelvic free fluid, making heterotopic pregnancy less  likely. This patient will need fluids, transfusion, and possible OB consult.   I have reviewed the following: old records at this facility.        Attending Critical Care:   Critical Care Times:   ==============================================================  · Total Critical Care Time - exclusive of procedural time: 35 minutes.  ==============================================================  Critical care was necessary to treat or prevent imminent or life-threatening deterioration of the following conditions: vaginal bleeding.                  Clinical Impression:   The primary encounter diagnosis was Vaginal bleeding affecting early pregnancy. A diagnosis of Spontaneous  was also pertinent to this visit.                             Shan Mtz MD  Resident  18 0054       Tarik De Souza MD  18 0059     Benefits, risks, and possible complications of procedure explained to patient/caregiver who verbalized understanding and gave verbal consent.

## (undated) DEVICE — HANDLE CURETTE W/TUBING

## (undated) DEVICE — TRAY DRY SKIN SCRUB PREP

## (undated) DEVICE — VACURETTE 12MM CURVED

## (undated) DEVICE — SOL PVP-I SCRUB 7.5% 4OZ

## (undated) DEVICE — GLOVE BIOGEL SKINSENSE PI 6.5

## (undated) DEVICE — PAD PREP 50/CA

## (undated) DEVICE — CONTAINER SPECIMEN STRL 4OZ

## (undated) DEVICE — Device

## (undated) DEVICE — SCRUB 10% POVIDONE IODINE 4OZ

## (undated) DEVICE — JELLY KY LUBRICATING 5G PACKET